# Patient Record
Sex: FEMALE | Race: ASIAN | Employment: UNEMPLOYED | URBAN - METROPOLITAN AREA
[De-identification: names, ages, dates, MRNs, and addresses within clinical notes are randomized per-mention and may not be internally consistent; named-entity substitution may affect disease eponyms.]

---

## 2017-03-20 ENCOUNTER — TELEPHONE (OUTPATIENT)
Dept: INTERNAL MEDICINE CLINIC | Age: 52
End: 2017-03-20

## 2017-03-20 NOTE — TELEPHONE ENCOUNTER
Pt's daughter  Layne Conner would like a return call ref to her mother's  Care.  [facility] ph# [phone]

## 2017-03-23 ENCOUNTER — OFFICE VISIT (OUTPATIENT)
Dept: INTERNAL MEDICINE CLINIC | Age: 52
End: 2017-03-23

## 2017-03-23 VITALS
DIASTOLIC BLOOD PRESSURE: 78 MMHG | OXYGEN SATURATION: 98 % | HEIGHT: 64 IN | RESPIRATION RATE: 16 BRPM | WEIGHT: 228 LBS | TEMPERATURE: 97.3 F | BODY MASS INDEX: 38.93 KG/M2 | SYSTOLIC BLOOD PRESSURE: 119 MMHG | HEART RATE: 71 BPM

## 2017-03-23 DIAGNOSIS — G43.909 MIGRAINE WITHOUT STATUS MIGRAINOSUS, NOT INTRACTABLE, UNSPECIFIED MIGRAINE TYPE: ICD-10-CM

## 2017-03-23 DIAGNOSIS — E11.00 UNCONTROLLED TYPE 2 DIABETES MELLITUS WITH HYPEROSMOLARITY WITHOUT COMA, WITHOUT LONG-TERM CURRENT USE OF INSULIN (HCC): Primary | ICD-10-CM

## 2017-03-23 DIAGNOSIS — F43.21 GRIEF: ICD-10-CM

## 2017-03-23 DIAGNOSIS — R60.0 BILATERAL EDEMA OF LOWER EXTREMITY: ICD-10-CM

## 2017-03-23 DIAGNOSIS — G44.229 CHRONIC TENSION-TYPE HEADACHE, NOT INTRACTABLE: ICD-10-CM

## 2017-03-23 DIAGNOSIS — R45.86 MOOD SWINGS: ICD-10-CM

## 2017-03-23 LAB
GLUCOSE POC: 93 MG/DL
HBA1C MFR BLD HPLC: 6.5 %

## 2017-03-23 RX ORDER — SUMATRIPTAN 100 MG/1
100 TABLET, FILM COATED ORAL
Qty: 9 TAB | Refills: 11 | Status: SHIPPED | OUTPATIENT
Start: 2017-03-23 | End: 2017-03-23 | Stop reason: SDUPTHER

## 2017-03-23 RX ORDER — ESOMEPRAZOLE MAGNESIUM 40 MG/1
40 CAPSULE, DELAYED RELEASE ORAL DAILY
Qty: 90 CAP | Refills: 3 | Status: SHIPPED | OUTPATIENT
Start: 2017-03-23 | End: 2018-04-06

## 2017-03-23 RX ORDER — AMITRIPTYLINE HYDROCHLORIDE 25 MG/1
25 TABLET, FILM COATED ORAL
Qty: 180 TAB | Refills: 3 | Status: SHIPPED | OUTPATIENT
Start: 2017-03-23 | End: 2018-04-04

## 2017-03-23 RX ORDER — SUMATRIPTAN 100 MG/1
100 TABLET, FILM COATED ORAL
Qty: 27 TAB | Refills: 3 | Status: SHIPPED | OUTPATIENT
Start: 2017-03-23 | End: 2017-11-22 | Stop reason: SDUPTHER

## 2017-03-23 NOTE — PROGRESS NOTES
1. Have you been to the ER, urgent care clinic since your last visit? Hospitalized since your last visit? No.    2. Have you seen or consulted any other health care providers outside of the [address] [place] [address] since your last visit? Include any pap smears or colon screening. No.  Had Excedrin extra strength  2 days ago.

## 2017-03-23 NOTE — MR AVS SNAPSHOT
Visit Information Date & Time Provider Department Dept. Phone Encounter #  
 3/23/2017 10:30 AM Aniceto Habermann, [address] [id number] Follow-up Instructions Return in about 3 months (around 6/23/2017) for DM. Upcoming Health Maintenance Date Due Hepatitis C Screening 1965 COLONOSCOPY 1/7/1981 BREAST CANCER SCRN MAMMOGRAM 9/2/2018 PAP AKA CERVICAL CYTOLOGY 6/2/2019 DTaP/Tdap/Td series (2 - Td) 6/2/2024 Allergies as of 3/23/2017  Review Complete On: 3/23/2017 By: Ajit Lui LPN Severity Noted Reaction Type Reactions Amoxicillin  08/05/2015    Cough Current Immunizations  Never Reviewed Name Date Influenza Vaccine 9/17/2013 Influenza Vaccine Split 12/22/2010 TB Skin Test (PPD) Intradermal 4/4/2016 Tdap 6/2/2014 11:17 PM  
  
 Not reviewed this visit You Were Diagnosed With   
  
 Codes Comments Uncontrolled type 2 diabetes mellitus with hyperosmolarity without coma, without long-term current use of insulin ([facility]    -  Primary ICD-10-CM: E11.00 ICD-9-CM: 250.22 Migraine without status migrainosus, not intractable, unspecified migraine type     ICD-10-CM: G43.909 ICD-9-CM: 346.90 Bilateral edema of lower extremity     ICD-10-CM: R60.0 ICD-9-CM: 150. 3 Chronic tension-type headache, not intractable     ICD-10-CM: E21.322 ICD-9-CM: 339.12 Grief     ICD-10-CM: B96.30 ICD-9-CM: 309.0 Mood swings ([facility]     ICD-10-CM: F39 
ICD-9-CM: 296.99 Vitals BP Pulse Temp Resp Height(growth percentile) Weight(growth percentile) 119/78 (BP 1 Location: Left arm, BP Patient Position: Sitting) 71 97.3 °F (36.3 °C) (Oral) 16 5' 4\" (1.626 m) 228 lb (103.4 kg) LMP SpO2 BMI OB Status Smoking Status 08/01/2008 98% 39.14 kg/m2 Postmenopausal Never Smoker Vitals History BMI and BSA Data  Body Mass Index Body Surface Area  
 39.14 kg/m 2 2.16 m 2  
  
  
 Preferred Pharmacy Pharmacy Name Phone [facility] [address], [address] [place] [address] Road [phone] Your Updated Medication List  
  
   
This list is accurate as of: 3/23/17 12:15 PM.  Always use your most recent med list.  
  
  
  
  
 * albuterol 90 mcg/actuation inhaler Commonly known as:  PROVENTIL HFA, VENTOLIN HFA, PROAIR HFA  
= 2 PUFF each dose, Inhalation, every 4 hours, PRN: as needed for wheezing or cough, # 1 EA, 3 Refills, Pharmacy: [facility] * albuterol 90 mcg/actuation inhaler Commonly known as:  PROVENTIL HFA, VENTOLIN HFA, PROAIR HFA  
= 2 PUFF each dose, Inhalation, every 4 hours, PRN: as needed for wheezing or cough, # 1 EA, 3 Refills, Pharmacy: [facility]  
  
 amitriptyline 25 mg tablet Commonly known as:  ELAVIL Take 1 Tab by mouth nightly. 1-2 pills at bedtime to reduce frequency of headaches  
  
 cyanocobalamin 1,000 mcg tablet Take 1,000 mcg by mouth daily. DIETARY SUPPLEMENT PO Take  by mouth.  
  
 esomeprazole 40 mg capsule Commonly known as:  NexIUM Take 1 Cap by mouth daily. mometasone 50 mcg/actuation nasal spray Commonly known as:  NASONEX  
by Nasal route. multivitamin tablet Commonly known as:  ONE A DAY Take 1 Tab by mouth daily. PEG 3350-Electrolytes 236-22.74-6.74 -5.86 gram suspension Commonly known as:  GO-LYTELY  
240 mL, PO, every 15 minutes, Please dispense(4)bisocodyl tabs to complete prep. Follow instructions from [facility]., 0 Refills, Dispense: 4,000, mL, Pharmacy [facility]  
  
 sertraline 25 mg tablet Commonly known as:  ZOLOFT Take 1 Tab by mouth daily. SUMAtriptan 100 mg tablet Commonly known as:  IMITREX Take 1 Tab by mouth once as needed for Migraine for up to 1 dose. May repeat in 2 hours prn, max 200 mg in 24 hours. traMADol 50 mg tablet Commonly known as:  ULTRAM  
Take 1 Tab by mouth every eight (8) hours as needed for Pain.  Max Daily Amount: 150 mg.  
  
 * Notice: This list has 2 medication(s) that are the same as other medications prescribed for you. Read the directions carefully, and ask your doctor or other care provider to review them with you. Prescriptions Sent to Pharmacy Refills SUMAtriptan (IMITREX) 100 mg tablet 3 Sig: Take 1 Tab by mouth once as needed for Migraine for up to 1 dose. May repeat in 2 hours prn, max 200 mg in 24 hours. Class: Normal  
 Pharmacy: [address] Ph #: [phone] Route: Oral  
 amitriptyline (ELAVIL) 25 mg tablet 3 Sig: Take 1 Tab by mouth nightly. 1-2 pills at bedtime to reduce frequency of headaches Class: Normal  
 Pharmacy: [address] Ph #: [phone] Route: Oral  
 esomeprazole (NEXIUM) 40 mg capsule 3 Sig: Take 1 Cap by mouth daily. Class: Normal  
 Pharmacy: [address] Ph #: [phone] Route: Oral  
  
We Performed the Following AMB POC GLUCOSE BLOOD, BY GLUCOSE MONITORING DEVICE [00050 CPT(R)] AMB POC HEMOGLOBIN A1C [17695 CPT(R)] AMB SUPPLY ORDER [4980270223 Custom] Comments: Moderate compression hose, knee high, 2 pairs LIPID PANEL [79894 CPT(R)] METABOLIC PANEL, COMPREHENSIVE [89608 CPT(R)] REFERRAL TO PSYCHIATRY [REF91 Custom] Comments:  
 Please evaluate patient for mood disorder Follow-up Instructions Return in about 3 months (around 6/23/2017) for DM. Referral Information Referral ID Referred By Referred To  
  
 [id number] Andrea Mayorga Not Available Visits Status Start Date End Date 1 New Request 3/23/17 3/23/18 If your referral has a status of pending review or denied, additional information will be sent to support the outcome of this decision. Introducing [facility] & HEALTH SERVICES! Paula Chaney introduces [employer] patient portal. Now you can access parts of your medical record, email your doctor's office, and request medication refills online. 1. In your internet browser, go to [email or web address] 2. Click on the First Time User? Click Here link in the Sign In box. You will see the New Member Sign Up page. 3. Enter your [employer] Access Code exactly as it appears below. You will not need to use this code after youve completed the sign-up process. If you do not sign up before the expiration date, you must request a new code. · [employer] Access Code: [email or web address] Expires: 6/21/2017 10:27 AM 
 
4. Enter the last four digits of your Social Security Number (xxxx) and Date of Birth (mm/dd/yyyy) as indicated and click Submit. You will be taken to the next sign-up page. 5. Create a Pokelabo ID. This will be your Pokelabo login ID and cannot be changed, so think of one that is secure and easy to remember. 6. Create a [employer]elabo password. You can change your password at any time. 7. Enter your Password Reset Question and Answer. This can be used at a later time if you forget your password. 8. Enter your e-mail address. You will receive e-mail notification when new information is available in [address] 9. Click Sign Up. You can now view and download portions of your medical record. 10. Click the Download Summary menu link to download a portable copy of your medical information. If you have questions, please visit the Frequently Asked Questions section of the [employer]elabo website. Remember, [employer]elabo is NOT to be used for urgent needs. For medical emergencies, dial 911. Now available from your iPhone and Android! Please provide this summary of care documentation to your next provider. Your primary care clinician is listed as [address]. If you have any questions after today's visit, please call [phone].

## 2017-03-23 NOTE — PROGRESS NOTES
Eda Augustin is a 45 y.o. female and presents with Leg Swelling; Migraine; and Gas  . C/o bilateral leg swelling x 2 months. Constant. Worse at end of the day. +SOB. No CP. No nausea or vomiting. Admits to getting increased swelling in the ankles at the end of the day. C/o headache x 4 days , but its gone now. Been taking Excedrin which prevents her from sleeping. Her 21 yr old son  suddenly on unknown causes a few wks ago. She is devastated and is intermittently crying throughout her interview. Her daughter is here and is concerned about her mother's mental state. She said her mom has always had a very difficult personality and describes her a narcissistic. This was occurring even before her brothers death. The mom is now moving in with her daughter and the family is interested in getting her some help so she can get along better with others. She wants her to see a psychiatrist.       Review of Systems  Constitutional: negative for fevers, chills, anorexia and weight loss  Eyes:   negative for visual disturbance and irritation  ENT:   negative for tinnitus,sore throat,nasal congestion,ear pains. hoarseness  Respiratory:  negative for cough, hemoptysis, dyspnea,wheezing  CV:   negative for chest pain, palpitations, lower extremity edema  GI:   negative for nausea, vomiting, diarrhea, abdominal pain,melena  Endo:               negative for polyuria,polydipsia,polyphagia,heat intolerance  Genitourinary: negative for frequency, dysuria and hematuria  Integument:  negative for rash and pruritus  Hematologic:  negative for easy bruising and gum/nose bleeding  Musculoskel: negative for myalgias, arthralgias, back pain, muscle weakness, joint pain  Neurological:  negative for headaches, dizziness, vertigo, memory problems and gait   Behavl/Psych: negative for feelings of anxiety, depression, mood changes    Past Medical History:   Diagnosis Date    Asthma     GERD (gastroesophageal reflux disease)     Headache(784.0)     migraine     History reviewed. No pertinent surgical history. Social History     Social History    Marital status: UNKNOWN     Spouse name: N/A    Number of children: N/A    Years of education: N/A     Social History Main Topics    Smoking status: Never Smoker    Smokeless tobacco: Never Used    Alcohol use No    Drug use: No    Sexual activity: Yes     Other Topics Concern    None     Social History Narrative     Current Outpatient Prescriptions   Medication Sig Dispense Refill    SUMAtriptan (IMITREX) 100 mg tablet Take 1 Tab by mouth once as needed for Migraine for up to 1 dose. May repeat in 2 hours prn, max 200 mg in 24 hours. 27 Tab 3    amitriptyline (ELAVIL) 25 mg tablet Take 1 Tab by mouth nightly. 1-2 pills at bedtime to reduce frequency of headaches 180 Tab 3    esomeprazole (NEXIUM) 40 mg capsule Take 1 Cap by mouth daily. 90 Cap 3    multivitamin (ONE A DAY) tablet Take 1 Tab by mouth daily. 30 Tab 6    cyanocobalamin 1,000 mcg tablet Take 1,000 mcg by mouth daily.  DIETARY SUPPLEMENT PO Take  by mouth.  traMADol (ULTRAM) 50 mg tablet Take 1 Tab by mouth every eight (8) hours as needed for Pain. Max Daily Amount: 150 mg. 40 Tab 0    albuterol (PROVENTIL HFA, VENTOLIN HFA, PROAIR HFA) 90 mcg/actuation inhaler   = 2 PUFF each dose, Inhalation, every 4 hours, PRN: as needed for wheezing or cough, # 1 EA, 3 Refills, Pharmacy: [facility]      albuterol (PROVENTIL HFA, VENTOLIN HFA, PROAIR HFA) 90 mcg/actuation inhaler   = 2 PUFF each dose, Inhalation, every 4 hours, PRN: as needed for wheezing or cough, # 1 EA, 3 Refills, Pharmacy: [facility]      mometasone (NASONEX) 50 mcg/actuation nasal spray by Nasal route.  sertraline (ZOLOFT) 25 mg tablet Take 1 Tab by mouth daily.  30 Tab 11    PEG 3350-Electrolytes (GO-LYTELY) 236-22.74-6.74 -5.86 gram suspension  240 mL, PO, every 15 minutes, Please dispense(4)bisocodyl tabs to complete prep.Follow instructions from [facility]., 0 Refills, Dispense: 4,000, mL, Pharmacy [facility]       Allergies   Allergen Reactions    Amoxicillin Cough       Objective:  Visit Vitals    /78 (BP 1 Location: Left arm, BP Patient Position: Sitting)    Pulse 71    Temp 97.3 °F (36.3 °C) (Oral)    Resp 16    Ht 5' 4\" (1.626 m)    Wt 228 lb (103.4 kg)    LMP 08/01/2008    SpO2 98%    BMI 39.14 kg/m2     Physical Exam:   General appearance - alert, well appearing, and in no distress  Mental status - alert, oriented to person, place, and time  Chest - clear to auscultation, no wheezes, rales or rhonchi, symmetric air entry   Heart - normal rate, regular rhythm, normal S1, S2, no murmurs, rubs, clicks or gallops   Abdomen - soft, nontender, nondistended, no masses or organomegaly  Lymph- no adenopathy palpable  Ext-peripheral pulses normal, no pedal edema, no clubbing or cyanosis  Skin-Warm and dry. no hyperpigmentation, vitiligo, or suspicious lesions  Neuro -alert, oriented, normal speech, no focal findings or movement disorder noted      Results for orders placed or performed in visit on 03/23/17   AMB POC GLUCOSE BLOOD, BY GLUCOSE MONITORING DEVICE   Result Value Ref Range    Glucose POC 93 mg/dL   AMB POC HEMOGLOBIN A1C   Result Value Ref Range    Hemoglobin A1c (POC) 6.5 %       Assessment/Plan:    ICD-10-CM ICD-9-CM    1. Uncontrolled type 2 diabetes mellitus with hyperosmolarity without coma, without long-term current use of insulin ([facility]) E11.00 250.22 AMB POC GLUCOSE BLOOD, BY GLUCOSE MONITORING DEVICE      AMB POC HEMOGLOBIN N5O      METABOLIC PANEL, COMPREHENSIVE      LIPID PANEL   2. Migraine without status migrainosus, not intractable, unspecified migraine type G43.909 346.90 SUMAtriptan (IMITREX) 100 mg tablet      DISCONTINUED: SUMAtriptan (IMITREX) 100 mg tablet   3. Bilateral edema of lower extremity R60.0 782.3 AMB SUPPLY ORDER   4.  Chronic tension-type headache, not intractable G44.229 339.12 amitriptyline (ELAVIL) 25 mg tablet   5. Grief F43.20 309.0    6. Mood swings ([facility]) F39 296.99 REFERRAL TO PSYCHIATRY     Orders Placed This Encounter    AMB SUPPLY ORDER     Moderate compression hose, knee high, 2 pairs    METABOLIC PANEL, COMPREHENSIVE    LIPID PANEL    REFERRAL TO PSYCHIATRY     Referral Priority:   Routine     Referral Type:   Behavioral Health     Referral Reason:   Specialty Services Required    AMB POC GLUCOSE BLOOD, BY GLUCOSE MONITORING DEVICE    AMB POC HEMOGLOBIN A1C    DISCONTD: SUMAtriptan (IMITREX) 100 mg tablet     Sig: Take 1 Tab by mouth once as needed for Migraine for up to 1 dose. May repeat in 2 hours prn, max 200 mg in 24 hours. Dispense:  9 Tab     Refill:  11    SUMAtriptan (IMITREX) 100 mg tablet     Sig: Take 1 Tab by mouth once as needed for Migraine for up to 1 dose. May repeat in 2 hours prn, max 200 mg in 24 hours. Dispense:  27 Tab     Refill:  3    amitriptyline (ELAVIL) 25 mg tablet     Sig: Take 1 Tab by mouth nightly. 1-2 pills at bedtime to reduce frequency of headaches     Dispense:  180 Tab     Refill:  3    esomeprazole (NEXIUM) 40 mg capsule     Sig: Take 1 Cap by mouth daily. Dispense:  90 Cap     Refill:  3     DM- control is slipping but is still good  Migraines- refilled meds  Personality d/o- psych referral  Grief over recent death of her son  Leg swelling which is really moreso due to Weight gain due to above- discussed diet and exercise  Very mild dependent edema- bilat compression stockings. Follow-up Disposition:  Return in about 3 months (around 6/23/2017) for DM.

## 2017-03-31 NOTE — TELEPHONE ENCOUNTER
Spoke with Tashia Sanderson, she stated she just needs to speak with the Dr. She did not want to give me any info. I suggested using my chart but she said she will try another time. Clear bilaterally, pupils equal, round and reactive to light.

## 2017-11-22 ENCOUNTER — OFFICE VISIT (OUTPATIENT)
Dept: INTERNAL MEDICINE CLINIC | Age: 52
End: 2017-11-22

## 2017-11-22 VITALS
HEART RATE: 79 BPM | OXYGEN SATURATION: 98 % | SYSTOLIC BLOOD PRESSURE: 125 MMHG | BODY MASS INDEX: 38.76 KG/M2 | TEMPERATURE: 97.6 F | HEIGHT: 64 IN | WEIGHT: 227 LBS | RESPIRATION RATE: 20 BRPM | DIASTOLIC BLOOD PRESSURE: 83 MMHG

## 2017-11-22 DIAGNOSIS — L72.3 SEBACEOUS CYST: ICD-10-CM

## 2017-11-22 DIAGNOSIS — Z12.11 COLON CANCER SCREENING: ICD-10-CM

## 2017-11-22 DIAGNOSIS — E66.9 OBESITY (BMI 30-39.9): ICD-10-CM

## 2017-11-22 DIAGNOSIS — G43.909 MIGRAINE WITHOUT STATUS MIGRAINOSUS, NOT INTRACTABLE, UNSPECIFIED MIGRAINE TYPE: Primary | ICD-10-CM

## 2017-11-22 RX ORDER — SUMATRIPTAN 100 MG/1
100 TABLET, FILM COATED ORAL
Qty: 27 TAB | Refills: 3 | Status: SHIPPED | OUTPATIENT
Start: 2017-11-22 | End: 2018-07-27 | Stop reason: SDUPTHER

## 2017-11-22 NOTE — PROGRESS NOTES
1. Have you been to the ER, urgent care clinic since your last visit? Hospitalized since your last visit? No    2. Have you seen or consulted any other health care providers outside of the [address] [place] [address] since your last visit? Include any pap smears or colon screening.  No.

## 2017-11-22 NOTE — MR AVS SNAPSHOT
Visit Information Date & Time Provider Department Dept. Phone Encounter #  
 11/22/2017 10:40 AM Lili Cortez, [address] [id number] Follow-up Instructions Return in about 1 year (around 11/22/2018) for physical exam. Upcoming Health Maintenance Date Due Hepatitis C Screening 1965 COLONOSCOPY 1/7/1981 PAP AKA CERVICAL CYTOLOGY 6/2/2019 BREAST CANCER SCRN MAMMOGRAM 9/11/2019 DTaP/Tdap/Td series (2 - Td) 6/2/2024 Allergies as of 11/22/2017  Review Complete On: 11/22/2017 By: Saida Contreras LPN Severity Noted Reaction Type Reactions Amoxicillin  08/05/2015    Cough Current Immunizations  Never Reviewed Name Date Influenza Vaccine 9/17/2013 Influenza Vaccine Split 12/22/2010 TB Skin Test (PPD) Intradermal 4/4/2016 Tdap 6/2/2014 11:17 PM  
  
 Not reviewed this visit You Were Diagnosed With   
  
 Codes Comments Migraine without status migrainosus, not intractable, unspecified migraine type    -  Primary ICD-10-CM: A76.042 ICD-9-CM: 346.90 Colon cancer screening     ICD-10-CM: Z12.11 ICD-9-CM: V76.51 Obesity (BMI 30-39. 9)     ICD-10-CM: E66.9 ICD-9-CM: 278.00 Vitals BP Pulse Temp Resp Height(growth percentile) Weight(growth percentile) 125/83 (BP 1 Location: Left arm, BP Patient Position: Sitting) 79 97.6 °F (36.4 °C) (Oral) 20 5' 4\" (1.626 m) 227 lb (103 kg) LMP SpO2 BMI OB Status Smoking Status 08/01/2008 98% 38.96 kg/m2 Postmenopausal Never Smoker Vitals History BMI and BSA Data Body Mass Index Body Surface Area  
 38.96 kg/m 2 2.16 m 2 Preferred Pharmacy Pharmacy Name Phone [facility] [address], [address] [phone] Your Updated Medication List  
  
   
This list is accurate as of: 11/22/17 11:51 AM.  Always use your most recent med list.  
  
  
  
  
 * albuterol 90 mcg/actuation inhaler Commonly known as:  PROVENTIL HFA, VENTOLIN HFA, PROAIR HFA  
= 2 PUFF each dose, Inhalation, every 4 hours, PRN: as needed for wheezing or cough, # 1 EA, 3 Refills, Pharmacy: [address] * albuterol 90 mcg/actuation inhaler Commonly known as:  PROVENTIL HFA, VENTOLIN HFA, PROAIR HFA  
= 2 PUFF each dose, Inhalation, every 4 hours, PRN: as needed for wheezing or cough, # 1 EA, 3 Refills, Pharmacy: [facility]  
  
 amitriptyline 25 mg tablet Commonly known as:  ELAVIL Take 1 Tab by mouth nightly. 1-2 pills at bedtime to reduce frequency of headaches  
  
 cyanocobalamin 1,000 mcg tablet Take 1,000 mcg by mouth daily. DIETARY SUPPLEMENT PO Take  by mouth.  
  
 esomeprazole 40 mg capsule Commonly known as:  NexIUM Take 1 Cap by mouth daily. mometasone 50 mcg/actuation nasal spray Commonly known as:  NASONEX  
by Nasal route. multivitamin tablet Commonly known as:  ONE A DAY Take 1 Tab by mouth daily. PEG 3350-Electrolytes 236-22.74-6.74 -5.86 gram suspension Commonly known as:  GO-LYTELY  
240 mL, PO, every 15 minutes, Please dispense(4)bisocodyl tabs to complete prep. Follow instructions from [facility] 0 Refills, Dispense: 4,000, mL, Pharmacy [facility]  
  
 sertraline 25 mg tablet Commonly known as:  ZOLOFT Take 1 Tab by mouth daily. SUMAtriptan 100 mg tablet Commonly known as:  IMITREX Take 1 Tab by mouth once as needed for Migraine for up to 1 dose. May repeat in 2 hours prn, max 200 mg in 24 hours. traMADol 50 mg tablet Commonly known as:  ULTRAM  
Take 1 Tab by mouth every eight (8) hours as needed for Pain. Max Daily Amount: 150 mg.  
  
 * Notice: This list has 2 medication(s) that are the same as other medications prescribed for you. Read the directions carefully, and ask your doctor or other care provider to review them with you. Prescriptions Sent to Pharmacy Refills SUMAtriptan (IMITREX) 100 mg tablet 3 Sig: Take 1 Tab by mouth once as needed for Migraine for up to 1 dose. May repeat in 2 hours prn, max 200 mg in 24 hours. Class: Normal  
 Pharmacy: [facility] [address], [address] [place] [address] #: [phone] Route: Oral  
  
We Performed the Following CBC W/O DIFF [92705 CPT(R)] LIPID PANEL [94387 CPT(R)] METABOLIC PANEL, COMPREHENSIVE [48419 CPT(R)] REFERRAL TO GASTROENTEROLOGY [TOR67 Custom] Comments: [facility] 
colonoscopy TSH 3RD GENERATION [70027 CPT(R)] Follow-up Instructions Return in about 1 year (around 11/22/2018) for physical exam.  
  
  
Referral Information Referral ID Referred By Referred To  
  
 [id number] Gabriella Honey Not Available Visits Status Start Date End Date 1 New Request 11/22/17 11/22/18 If your referral has a status of pending review or denied, additional information will be sent to support the outcome of this decision. Introducing [facility] & HEALTH SERVICES! [facility] introduces [employer] patient portal. Now you can access parts of your medical record, email your doctor's office, and request medication refills online. 1. In your internet browser, go to [email or web address] 2. Click on the First Time User? Click Here link in the Sign In box. You will see the New Member Sign Up page. 3. Enter your [employer] Access Code exactly as it appears below. You will not need to use this code after youve completed the sign-up process. If you do not sign up before the expiration date, you must request a new code. · [employer] Access Code: [email or web address] Expires: 2/20/2018 11:51 AM 
 
4. Enter the last four digits of your Social Security Number (xxxx) and Date of Birth (mm/dd/yyyy) as indicated and click Submit. You will be taken to the next sign-up page. 5. Create a [employer] ID.  This will be your [employer] login ID and cannot be changed, so think of one that is secure and easy to remember. 6. Create a [employer]idio password. You can change your password at any time. 7. Enter your Password Reset Question and Answer. This can be used at a later time if you forget your password. 8. Enter your e-mail address. You will receive e-mail notification when new information is available in [employer]. 9. Click Sign Up. You can now view and download portions of your medical record. 10. Click the Download Summary menu link to download a portable copy of your medical information. If you have questions, please visit the Frequently Asked Questions section of the [employer] website. Remember, [employer] is NOT to be used for urgent needs. For medical emergencies, dial 911. Now available from your iPhone and Android! Please provide this summary of care documentation to your next provider. Your primary care clinician is listed as Roddy Pruett. If you have any questions after today's visit, please call [phone].

## 2017-11-22 NOTE — PROGRESS NOTES
Rashid Nelson is a 45 y.o. female and presents with Migraine; Foot Pain; and Medication Refill  . Subjective:  Pt has [address] insurance    First visit w me. Pt requests med refills. Heel spur-[facility] s/p 3 cortisone inj w/o improvement. Will be starting PT    Migraine headaches-requests refill on imitrex. Currently quiescent    HM-due for routine labs and colonoscopy    Pt has an enlarging bump on her forehead and would like a referral to derm    Review of Systems  Constitutional: negative for fevers, chills, anorexia and weight loss  Respiratory:  negative for cough, hemoptysis, dyspnea,wheezing  CV:   negative for chest pain, palpitations, lower extremity edema  GI:   negative for nausea, vomiting, diarrhea, abdominal pain,melena  Musculoskel: positive for myalgias, arthralgias, back pain,  joint pain  Neurological:  positive for headaches, dizziness, vertigo, memory problems   Behavl/Psych: negative for feelings of anxiety, depression, mood changes    Past Medical History:   Diagnosis Date    Asthma     GERD (gastroesophageal reflux disease)     Headache(784.0)     migraine     History reviewed. No pertinent surgical history.   Social History     Social History    Marital status: UNKNOWN     Spouse name: N/A    Number of children: N/A    Years of education: N/A     Social History Main Topics    Smoking status: Never Smoker    Smokeless tobacco: Never Used    Alcohol use No    Drug use: No    Sexual activity: Yes     Other Topics Concern    None     Social History Narrative     Family History   Problem Relation Age of Onset    Diabetes Mother     Hypertension Mother     Heart Disease Father     Cancer Father     No Known Problems Sister     No Known Problems Brother     No Known Problems Maternal Grandmother     No Known Problems Maternal Grandfather     No Known Problems Paternal Grandmother     No Known Problems Paternal Grandfather     No Known Problems Brother     No Known Problems Brother      Current Outpatient Prescriptions   Medication Sig Dispense Refill    SUMAtriptan (IMITREX) 100 mg tablet Take 1 Tab by mouth once as needed for Migraine for up to 1 dose. May repeat in 2 hours prn, max 200 mg in 24 hours. 27 Tab 3    esomeprazole (NEXIUM) 40 mg capsule Take 1 Cap by mouth daily. 90 Cap 3    multivitamin (ONE A DAY) tablet Take 1 Tab by mouth daily. 30 Tab 6    albuterol (PROVENTIL HFA, VENTOLIN HFA, PROAIR HFA) 90 mcg/actuation inhaler   = 2 PUFF each dose, Inhalation, every 4 hours, PRN: as needed for wheezing or cough, # 1 EA, 3 Refills, Pharmacy: [facility]      albuterol (PROVENTIL HFA, VENTOLIN HFA, PROAIR HFA) 90 mcg/actuation inhaler   = 2 PUFF each dose, Inhalation, every 4 hours, PRN: as needed for wheezing or cough, # 1 EA, 3 Refills, Pharmacy: [facility]      amitriptyline (ELAVIL) 25 mg tablet Take 1 Tab by mouth nightly. 1-2 pills at bedtime to reduce frequency of headaches 180 Tab 3    mometasone (NASONEX) 50 mcg/actuation nasal spray by Nasal route. Allergies   Allergen Reactions    Amoxicillin Cough       Objective:  Visit Vitals    /83 (BP 1 Location: Left arm, BP Patient Position: Sitting)    Pulse 79    Temp 97.6 °F (36.4 °C) (Oral)    Resp 20    Ht 5' 4\" (1.626 m)    Wt 227 lb (103 kg)    LMP 08/01/2008    SpO2 98%    BMI 38.96 kg/m2     Physical Exam:   General appearance - morbidly obese lady  Mental status - alert, oriented to person, place, and time  EYE-EOMI  Mouth - mucous membranes moist, pharynx normal without lesions  Chest - clear to auscultation, no wheezes, rales or rhonchi, symmetric air entry   Heart - normal rate, regular rhythm, normal S1, S2, no murmurs, rubs, clicks or gallops   Ext-peripheral pulses normal, no pedal edema, no clubbing or cyanosis  Skin-Warm and dry.  Small sq, firm mass mid forehead at hairline   Neuro -alert, oriented, normal speech, no focal findings or movement disorder noted        Results for orders placed or performed in visit on 03/23/17   AMB POC GLUCOSE BLOOD, BY GLUCOSE MONITORING DEVICE   Result Value Ref Range    Glucose POC 93 mg/dL   AMB POC HEMOGLOBIN A1C   Result Value Ref Range    Hemoglobin A1c (POC) 6.5 %       Assessment/Plan:    ICD-10-CM ICD-9-CM    1. Migraine without status migrainosus, not intractable, unspecified migraine type G43.909 346.90 SUMAtriptan (IMITREX) 100 mg tablet      TSH 3RD GENERATION      LIPID PANEL      METABOLIC PANEL, COMPREHENSIVE      CBC W/O DIFF   2. Colon cancer screening Z12.11 V76.51 REFERRAL TO GASTROENTEROLOGY   3. Obesity (BMI 30-39. 9) E66.9 278.00    4. Sebaceous cyst L72.3 706.2 REFERRAL TO DERMATOLOGY     Orders Placed This Encounter    TSH 3RD GENERATION    LIPID PANEL    METABOLIC PANEL, COMPREHENSIVE    CBC W/O DIFF    REFERRAL TO GASTROENTEROLOGY     Referral Priority:   Routine     Referral Type:   Consultation     Referral Reason:   Specialty Services Required     Requested Specialty:   Gastroenterology    REFERRAL TO DERMATOLOGY     Referral Priority:   Routine     Referral Type:   Consultation     Referral Reason:   Specialty Services Required     Requested Specialty:   Dermatology    SUMAtriptan (IMITREX) 100 mg tablet     Sig: Take 1 Tab by mouth once as needed for Migraine for up to 1 dose. May repeat in 2 hours prn, max 200 mg in 24 hours. Dispense:  27 Tab     Refill:  3   check routine labs  lose weight, increase physical activity  Patient Instructions               Follow-up Disposition:  Return in about 1 year (around 11/22/2018) for physical exam.      I have reviewed with the patient details of the assessment and plan and all questions were answered. Relevent patient education was performed. The most recent lab findings were reviewed with the patient. An After Visit Summary was printed and given to the patient.

## 2017-11-29 ENCOUNTER — DOCUMENTATION ONLY (OUTPATIENT)
Dept: INTERNAL MEDICINE CLINIC | Age: 52
End: 2017-11-29

## 2017-11-29 RX ORDER — BISMUTH SUBSALICYLATE 262 MG
1 TABLET,CHEWABLE ORAL DAILY
Qty: 30 TAB | Refills: 6 | Status: SHIPPED | OUTPATIENT
Start: 2017-11-29 | End: 2018-07-27 | Stop reason: SDUPTHER

## 2017-11-29 NOTE — PROGRESS NOTES
Appointment schedule for Dermatology at the [facility] for Friday May 4, 2018 at 2pm. Patient is aware.

## 2017-12-01 ENCOUNTER — TELEPHONE (OUTPATIENT)
Dept: INTERNAL MEDICINE CLINIC | Age: 52
End: 2017-12-01

## 2017-12-08 ENCOUNTER — TELEPHONE (OUTPATIENT)
Dept: INTERNAL MEDICINE CLINIC | Age: 52
End: 2017-12-08

## 2017-12-08 NOTE — TELEPHONE ENCOUNTER
Please call her back. I dont have a copy of it, it went to scanning. I mailed a copy of it to her home and she is out of town. She can contact us again in a week or call [facility].

## 2017-12-08 NOTE — TELEPHONE ENCOUNTER
Patient would like for you to give her a call and go over her labs with her, she states that she didn't received the letter that went out, even if she did she would not understand it any, she can be reach after 11am today

## 2017-12-12 NOTE — TELEPHONE ENCOUNTER
Spoke to patient and told her to call back in like 2 weeks when the labs are scan so we can go over them.

## 2018-01-10 ENCOUNTER — OFFICE VISIT (OUTPATIENT)
Dept: INTERNAL MEDICINE CLINIC | Age: 53
End: 2018-01-10

## 2018-01-10 VITALS
OXYGEN SATURATION: 98 % | SYSTOLIC BLOOD PRESSURE: 131 MMHG | HEIGHT: 64 IN | RESPIRATION RATE: 20 BRPM | DIASTOLIC BLOOD PRESSURE: 90 MMHG | WEIGHT: 228 LBS | BODY MASS INDEX: 38.93 KG/M2 | HEART RATE: 75 BPM | TEMPERATURE: 98.2 F

## 2018-01-10 DIAGNOSIS — L08.9 INFECTED SEBACEOUS CYST: Primary | ICD-10-CM

## 2018-01-10 DIAGNOSIS — L72.3 SEBACEOUS CYST: ICD-10-CM

## 2018-01-10 DIAGNOSIS — L72.3 INFECTED SEBACEOUS CYST: Primary | ICD-10-CM

## 2018-01-10 RX ORDER — SULFAMETHOXAZOLE AND TRIMETHOPRIM 800; 160 MG/1; MG/1
1 TABLET ORAL 2 TIMES DAILY
Qty: 20 TAB | Refills: 0 | Status: SHIPPED | OUTPATIENT
Start: 2018-01-10 | End: 2018-04-04

## 2018-01-10 NOTE — PROGRESS NOTES
1. Have you been to the ER, urgent care clinic since your last visit? Hospitalized since your last visit? No.    2. Have you seen or consulted any other health care providers outside of the [address] [place] [address] since your last visit? Include any pap smears or colon screening.  No.

## 2018-01-10 NOTE — PROGRESS NOTES
Hussein De Los Santos is a 52 y.o. female and presents with Cyst (fore head) and Medication Refill  . Subjective:    Cyst on pts forehead has become red, swollen and tender. Pt has an appointment to see dermatology @ [facility] in May. Review of Systems  Constitutional: negative for fevers, chills, anorexia and weight loss  Respiratory:  negative for cough, hemoptysis, dyspnea,wheezing  CV:   negative for chest pain, palpitations, lower extremity edema  GI:   negative for nausea, vomiting, diarrhea, abdominal pain,melena  Musculoskel: positive for myalgias, arthralgias, back pain, muscle weakness, joint pain  Neurological:  negative for headaches, dizziness, vertigo, memory problems and gait   Behavl/Psych: positive for feelings of anxiety, depression, mood changes    Past Medical History:   Diagnosis Date    Asthma     GERD (gastroesophageal reflux disease)     Headache(784.0)     migraine     History reviewed. No pertinent surgical history. Social History     Social History    Marital status: UNKNOWN     Spouse name: N/A    Number of children: N/A    Years of education: N/A     Social History Main Topics    Smoking status: Never Smoker    Smokeless tobacco: Never Used    Alcohol use No    Drug use: No    Sexual activity: Yes     Other Topics Concern    None     Social History Narrative     Family History   Problem Relation Age of Onset    Diabetes Mother     Hypertension Mother     Heart Disease Father     Cancer Father     No Known Problems Sister     No Known Problems Brother     No Known Problems Maternal Grandmother     No Known Problems Maternal Grandfather     No Known Problems Paternal Grandmother     No Known Problems Paternal Grandfather     No Known Problems Brother     No Known Problems Brother      Current Outpatient Prescriptions   Medication Sig Dispense Refill    trimethoprim-sulfamethoxazole (BACTRIM DS, SEPTRA DS) 160-800 mg per tablet Take 1 Tab by mouth two (2) times a day.  21 Tab 0    multivitamin (ONE A DAY) tablet Take 1 Tab by mouth daily. 30 Tab 6    SUMAtriptan (IMITREX) 100 mg tablet Take 1 Tab by mouth once as needed for Migraine for up to 1 dose. May repeat in 2 hours prn, max 200 mg in 24 hours. 27 Tab 3    amitriptyline (ELAVIL) 25 mg tablet Take 1 Tab by mouth nightly. 1-2 pills at bedtime to reduce frequency of headaches 180 Tab 3    esomeprazole (NEXIUM) 40 mg capsule Take 1 Cap by mouth daily. 90 Cap 3    albuterol (PROVENTIL HFA, VENTOLIN HFA, PROAIR HFA) 90 mcg/actuation inhaler   = 2 PUFF each dose, Inhalation, every 4 hours, PRN: as needed for wheezing or cough, # 1 EA, 3 Refills, Pharmacy: [facility]      albuterol (PROVENTIL HFA, VENTOLIN HFA, PROAIR HFA) 90 mcg/actuation inhaler   = 2 PUFF each dose, Inhalation, every 4 hours, PRN: as needed for wheezing or cough, # 1 EA, 3 Refills, Pharmacy: [facility]      mometasone (NASONEX) 50 mcg/actuation nasal spray by Nasal route. Allergies   Allergen Reactions    Amoxicillin Cough       Objective:  Visit Vitals    /90 (BP 1 Location: Left arm, BP Patient Position: Sitting)    Pulse 75    Temp 98.2 °F (36.8 °C) (Oral)    Resp 20    Ht 5' 4\" (1.626 m)    Wt 228 lb (103.4 kg)    LMP 08/01/2008    SpO2 98%    BMI 39.14 kg/m2     Physical Exam:   General appearance - alert, obese, pleasant  Mental status - alert, oriented to person, place, and time  Haed:~ .5cm area of erythema and warmth + tenderness at hairline/midline  EYE-EOMI  Neck - supple,   Chest - symmetric air entry    Abdomen - obese  Ext-no pedal edema, no clubbing or cyanosis  Skin-Warm and dry.  no hyperpigmentation, vitiligo, or suspicious lesions  Neuro -alert, oriented, normal speech, no focal findings or movement disorder noted        Results for orders placed or performed in visit on 03/23/17   AMB POC GLUCOSE BLOOD, BY GLUCOSE MONITORING DEVICE   Result Value Ref Range    Glucose POC 93 mg/dL   AMB POC HEMOGLOBIN A1C   Result Value Ref Range    Hemoglobin A1c (POC) 6.5 %       Assessment/Plan:    ICD-10-CM ICD-9-CM    1. Infected sebaceous cyst L72.3 706.2 trimethoprim-sulfamethoxazole (BACTRIM DS, SEPTRA DS) 160-800 mg per tablet    L08.9  REFERRAL TO GENERAL SURGERY   2. Sebaceous cyst L72.3 706.2      Orders Placed This Encounter    REFERRAL TO GENERAL SURGERY     Referral Priority:   Routine     Referral Type:   Consultation     Referral Reason:   Specialty Services Required    trimethoprim-sulfamethoxazole (BACTRIM DS, SEPTRA DS) 160-800 mg per tablet     Sig: Take 1 Tab by mouth two (2) times a day. Dispense:  20 Tab     Refill:  0     Referral to general surgery for removal  Bactrim DS given  F/u routine/prn  Patient Instructions          Epidermoid Cyst: Care Instructions  Your Care Instructions  An epidermoid (say \"ps-jpn-OMJ-pita\") cyst is a lump just under the skin. These cysts can form when a hair follicle becomes blocked. They are common in acne and may occur on the face, neck, back, and genitals. However, they can form anywhere on the body. These cysts are not cancer and do not lead to cancer. They tend not to hurt, but they can sometimes become swollen and painful. They also may break open (rupture) and cause scarring. These cysts sometimes do not cause problems and may not need treatment. If you have a cyst that is swollen and hurts, your doctor may inject it with a medicine to help it heal. But it is more likely that a painful cyst will need to be removed. Your doctor will give you a shot of numbing medicine and cut into the cyst to drain it or remove it. This makes the symptoms go away. Follow-up care is a key part of your treatment and safety. Be sure to make and go to all appointments, and call your doctor if you are having problems. It's also a good idea to know your test results and keep a list of the medicines you take. How can you care for yourself at home?   · Do not squeeze the cyst or poke it with a needle to open it. This can cause swelling, redness, and infection. · Always have a doctor look at any new lumps you get to make sure that they are not serious. When should you call for help? Watch closely for changes in your health, and be sure to contact your doctor if:  ? · You have a fever, redness, or swelling after you get a shot of medicine in the cyst.   ? · You see or feel a new lump on your skin. Where can you learn more? Go to http://carolynn-loulou.info/. Enter U060 in the search box to learn more about \"Epidermoid Cyst: Care Instructions. \"  Current as of: October 13, 2016  Content Version: 11.4  © 5400-3411 [employer]. Care instructions adapted under license by [employer] (which disclaims liability or warranty for this information). If you have questions about a medical condition or this instruction, always ask your healthcare professional. [place] [address] any warranty or liability for your use of this information. Follow-up Disposition:  Return if symptoms worsen or fail to improve. I have reviewed with the patient details of the assessment and plan and all questions were answered. Relevent patient education was performed. The most recent lab findings were reviewed with the patient. An After Visit Summary was printed and given to the patient.

## 2018-01-10 NOTE — MR AVS SNAPSHOT
Visit Information Date & Time Provider Department Dept. Phone Encounter #  
 1/10/2018  2:20 PM Jelly Pickens, [address]  [id number] Follow-up Instructions Return if symptoms worsen or fail to improve. Your Appointments 11/26/2018  1:00 PM  
ROUTINE CARE with Jelly Pickens MD  
[address]) Appt Note: Complete physical; Complete physical  
 [place] [address] [facility] [id number]  
[phone]  
  
   
 [place] [address] Upcoming Health Maintenance Date Due Hepatitis C Screening 1965 COLONOSCOPY 1/7/1981 PAP AKA CERVICAL CYTOLOGY 6/2/2019 BREAST CANCER SCRN MAMMOGRAM 9/11/2019 DTaP/Tdap/Td series (2 - Td) 6/2/2024 Allergies as of 1/10/2018  Review Complete On: 1/10/2018 By: Gurinder Alexandra LPN Severity Noted Reaction Type Reactions Amoxicillin  08/05/2015    Cough Current Immunizations  Never Reviewed Name Date Influenza Vaccine 9/17/2013 Influenza Vaccine Split 12/22/2010 TB Skin Test (PPD) Intradermal 4/4/2016 Tdap 6/2/2014 11:17 PM  
  
 Not reviewed this visit You Were Diagnosed With   
  
 Codes Comments Infected sebaceous cyst    -  Primary ICD-10-CM: L72.3, L08.9 ICD-9-CM: 706.2 Sebaceous cyst     ICD-10-CM: L72.3 ICD-9-CM: 706. 2 Vitals BP Pulse Temp Resp Height(growth percentile) Weight(growth percentile) 131/90 (BP 1 Location: Left arm, BP Patient Position: Sitting) 75 98.2 °F (36.8 °C) (Oral) 20 5' 4\" (1.626 m) 228 lb (103.4 kg) LMP SpO2 BMI OB Status Smoking Status 08/01/2008 98% 39.14 kg/m2 Postmenopausal Never Smoker Vitals History BMI and BSA Data Body Mass Index Body Surface Area  
 39.14 kg/m 2 2.16 m 2 Preferred Pharmacy Pharmacy Name Phone [facility] [address], [address] [place] [address] [phone] Your Updated Medication List  
  
   
This list is accurate as of: 1/10/18  2:56 PM.  Always use your most recent med list.  
  
  
  
  
 * albuterol 90 mcg/actuation inhaler Commonly known as:  PROVENTIL HFA, VENTOLIN HFA, PROAIR HFA  
= 2 PUFF each dose, Inhalation, every 4 hours, PRN: as needed for wheezing or cough, # 1 EA, 3 Refills, Pharmacy: [facility] * albuterol 90 mcg/actuation inhaler Commonly known as:  PROVENTIL HFA, VENTOLIN HFA, PROAIR HFA  
= 2 PUFF each dose, Inhalation, every 4 hours, PRN: as needed for wheezing or cough, # 1 EA, 3 Refills, Pharmacy: [facility]  
  
 amitriptyline 25 mg tablet Commonly known as:  ELAVIL Take 1 Tab by mouth nightly. 1-2 pills at bedtime to reduce frequency of headaches  
  
 esomeprazole 40 mg capsule Commonly known as:  NexIUM Take 1 Cap by mouth daily. mometasone 50 mcg/actuation nasal spray Commonly known as:  NASONEX  
by Nasal route. multivitamin tablet Commonly known as:  ONE A DAY Take 1 Tab by mouth daily. SUMAtriptan 100 mg tablet Commonly known as:  IMITREX Take 1 Tab by mouth once as needed for Migraine for up to 1 dose. May repeat in 2 hours prn, max 200 mg in 24 hours. trimethoprim-sulfamethoxazole 160-800 mg per tablet Commonly known as:  BACTRIM DS, SEPTRA DS Take 1 Tab by mouth two (2) times a day. * Notice: This list has 2 medication(s) that are the same as other medications prescribed for you. Read the directions carefully, and ask your doctor or other care provider to review them with you. Prescriptions Sent to Pharmacy Refills  
 trimethoprim-sulfamethoxazole (BACTRIM DS, SEPTRA DS) 160-800 mg per tablet 0 Sig: Take 1 Tab by mouth two (2) times a day. Class: Normal  
 Pharmacy: [facility] [address], [address] [place] [address] Ph #: [phone] Route: Oral  
  
We Performed the Following REFERRAL TO GENERAL SURGERY [REF27 Custom] Comments:  
 Pt has [address] Follow-up Instructions Return if symptoms worsen or fail to improve. Referral Information Referral ID Referred By Referred To  
  
 [id number] Shahab Hodgson Not Available Visits Status Start Date End Date 1 New Request 1/10/18 1/10/19 If your referral has a status of pending review or denied, additional information will be sent to support the outcome of this decision. Patient Instructions Epidermoid Cyst: Care Instructions Your Care Instructions An epidermoid (say \"dv-rni-WYE-pita\") cyst is a lump just under the skin. These cysts can form when a hair follicle becomes blocked. They are common in acne and may occur on the face, neck, back, and genitals. However, they can form anywhere on the body. These cysts are not cancer and do not lead to cancer. They tend not to hurt, but they can sometimes become swollen and painful. They also may break open (rupture) and cause scarring. These cysts sometimes do not cause problems and may not need treatment. If you have a cyst that is swollen and hurts, your doctor may inject it with a medicine to help it heal. But it is more likely that a painful cyst will need to be removed. Your doctor will give you a shot of numbing medicine and cut into the cyst to drain it or remove it. This makes the symptoms go away. Follow-up care is a key part of your treatment and safety. Be sure to make and go to all appointments, and call your doctor if you are having problems. It's also a good idea to know your test results and keep a list of the medicines you take. How can you care for yourself at home? · Do not squeeze the cyst or poke it with a needle to open it. This can cause swelling, redness, and infection. · Always have a doctor look at any new lumps you get to make sure that they are not serious. When should you call for help? Watch closely for changes in your health, and be sure to contact your doctor if: 
? · You have a fever, redness, or swelling after you get a shot of medicine in the cyst.  
? · You see or feel a new lump on your skin. Where can you learn more? Go to http://carolynn-loulou.info/. Enter V769 in the search box to learn more about \"Epidermoid Cyst: Care Instructions. \" Current as of: October 13, 2016 Content Version: 11.4 © 8110-8553 [employer]. Care instructions adapted under license by [employer] (which disclaims liability or warranty for this information). If you have questions about a medical condition or this instruction, always ask your healthcare professional. [address] any warranty or liability for your use of this information. Introducing [facility]! Celeste French introduces The [employer] patient portal. Now you can access parts of your medical record, email your doctor's office, and request medication refills online. 1. In your internet browser, go to [email or web address] 2. Click on the First Time User? Click Here link in the Sign In box. You will see the New Member Sign Up page. 3. Enter your [employer] Access Code exactly as it appears below. You will not need to use this code after youve completed the sign-up process. If you do not sign up before the expiration date, you must request a new code. · [employer] Access Code: [email or web address] Expires: 2/20/2018 11:51 AM 
 
4. Enter the last four digits of your Social Security Number (xxxx) and Date of Birth (mm/dd/yyyy) as indicated and click Submit. You will be taken to the next sign-up page. 5. Create a [employer] ID. This will be your [employer] login ID and cannot be changed, so think of one that is secure and easy to remember. 6. Create a [employer] password. You can change your password at any time. 7. Enter your Password Reset Question and Answer. This can be used at a later time if you forget your password. 8. Enter your e-mail address. You will receive e-mail notification when new information is available in [address] 9. Click Sign Up. You can now view and download portions of your medical record. 10. Click the Download Summary menu link to download a portable copy of your medical information. If you have questions, please visit the Frequently Asked Questions section of the [employer] website. Remember, [employer] is NOT to be used for urgent needs. For medical emergencies, dial 911. Now available from your iPhone and Android! Please provide this summary of care documentation to your next provider. Your primary care clinician is listed as Anais Orantes. If you have any questions after today's visit, please call [phone].

## 2018-01-10 NOTE — PATIENT INSTRUCTIONS
Epidermoid Cyst: Care Instructions  Your Care Instructions  An epidermoid (say \"xd-thx-DYG-pita\") cyst is a lump just under the skin. These cysts can form when a hair follicle becomes blocked. They are common in acne and may occur on the face, neck, back, and genitals. However, they can form anywhere on the body. These cysts are not cancer and do not lead to cancer. They tend not to hurt, but they can sometimes become swollen and painful. They also may break open (rupture) and cause scarring. These cysts sometimes do not cause problems and may not need treatment. If you have a cyst that is swollen and hurts, your doctor may inject it with a medicine to help it heal. But it is more likely that a painful cyst will need to be removed. Your doctor will give you a shot of numbing medicine and cut into the cyst to drain it or remove it. This makes the symptoms go away. Follow-up care is a key part of your treatment and safety. Be sure to make and go to all appointments, and call your doctor if you are having problems. It's also a good idea to know your test results and keep a list of the medicines you take. How can you care for yourself at home? · Do not squeeze the cyst or poke it with a needle to open it. This can cause swelling, redness, and infection. · Always have a doctor look at any new lumps you get to make sure that they are not serious. When should you call for help? Watch closely for changes in your health, and be sure to contact your doctor if:  ? · You have a fever, redness, or swelling after you get a shot of medicine in the cyst.   ? · You see or feel a new lump on your skin. Where can you learn more? Go to http://carolynn-loulou.info/. Enter A461 in the search box to learn more about \"Epidermoid Cyst: Care Instructions. \"  Current as of: October 13, 2016  Content Version: 11.4  © 2360-9080 Healthwise, [employer].  Care instructions adapted under license by [employer] (which disclaims liability or warranty for this information). If you have questions about a medical condition or this instruction, always ask your healthcare professional. [address] any warranty or liability for your use of this information.

## 2018-04-04 ENCOUNTER — OFFICE VISIT (OUTPATIENT)
Dept: INTERNAL MEDICINE CLINIC | Age: 53
End: 2018-04-04

## 2018-04-04 VITALS
BODY MASS INDEX: 39.09 KG/M2 | OXYGEN SATURATION: 96 % | RESPIRATION RATE: 20 BRPM | DIASTOLIC BLOOD PRESSURE: 59 MMHG | WEIGHT: 229 LBS | HEART RATE: 86 BPM | TEMPERATURE: 98.4 F | HEIGHT: 64 IN | SYSTOLIC BLOOD PRESSURE: 106 MMHG

## 2018-04-04 DIAGNOSIS — M25.521 ELBOW PAIN, RIGHT: ICD-10-CM

## 2018-04-04 DIAGNOSIS — E11.9 DIABETES MELLITUS TYPE 2, DIET-CONTROLLED (HCC): ICD-10-CM

## 2018-04-04 DIAGNOSIS — J40 BRONCHITIS: ICD-10-CM

## 2018-04-04 DIAGNOSIS — Z00.00 WELL ADULT EXAM: Primary | ICD-10-CM

## 2018-04-04 DIAGNOSIS — R93.89 ABNORMAL CHEST X-RAY: ICD-10-CM

## 2018-04-04 DIAGNOSIS — G47.33 OSA (OBSTRUCTIVE SLEEP APNEA): ICD-10-CM

## 2018-04-04 DIAGNOSIS — F41.1 GENERALIZED ANXIETY DISORDER: ICD-10-CM

## 2018-04-04 DIAGNOSIS — E66.9 OBESITY (BMI 35.0-39.9 WITHOUT COMORBIDITY): ICD-10-CM

## 2018-04-04 LAB
CREATININE, EXTERNAL: 0.61
HBA1C MFR BLD HPLC: 7.4 %
LDL-C, EXTERNAL: 99

## 2018-04-04 RX ORDER — SERTRALINE HYDROCHLORIDE 50 MG/1
50 TABLET, FILM COATED ORAL DAILY
Qty: 30 TAB | Refills: 5 | Status: SHIPPED | OUTPATIENT
Start: 2018-04-04 | End: 2018-08-16 | Stop reason: SDUPTHER

## 2018-04-04 RX ORDER — AZITHROMYCIN 250 MG/1
TABLET, FILM COATED ORAL
Qty: 6 TAB | Refills: 0 | Status: SHIPPED | OUTPATIENT
Start: 2018-04-04 | End: 2018-09-13

## 2018-04-04 NOTE — MR AVS SNAPSHOT
[facility] 
 
 
 [place] [address] [address] 
[phone] Patient: Stacey Choi MRN: [id number] :1965 Visit Information Date & Time Provider Department Dept. Phone Encounter #  
 2018  2:00 PM Moreno Don, [address] [id number] Follow-up Instructions Return in about 6 weeks (around 2018). Your Appointments 2018  1:00 PM  
ROUTINE CARE with Moreno Don MD  
[facility]) Appt Note: Complete physical; Complete physical  
 [place] [address] [address]  
[phone]  
  
   
 [place] [address] Upcoming Health Maintenance Date Due Hepatitis C Screening 1965 COLONOSCOPY 1981 PAP AKA CERVICAL CYTOLOGY 2019 BREAST CANCER SCRN MAMMOGRAM 2019 DTaP/Tdap/Td series (2 - Td) 2024 Allergies as of 2018  Review Complete On: 2018 By: Rigo Peters LPN Severity Noted Reaction Type Reactions Amoxicillin  2015    Cough Current Immunizations  Never Reviewed Name Date Influenza Vaccine 2013 Influenza Vaccine Split 2010 TB Skin Test (PPD) Intradermal 2016 Tdap 2014 11:17 PM  
  
 Not reviewed this visit Vitals BP Pulse Temp Resp Height(growth percentile) Weight(growth percentile) 106/59 (BP 1 Location: Left arm, BP Patient Position: Sitting) 86 98.4 °F (36.9 °C) (Oral) 20 5' 4\" (1.626 m) 229 lb (103.9 kg) LMP SpO2 BMI OB Status Smoking Status 2008 96% 39.31 kg/m2 Postmenopausal Never Smoker Vitals History BMI and BSA Data Body Mass Index Body Surface Area  
 39.31 kg/m 2 2.17 m 2 Preferred Pharmacy [facility] [address], [address] [phone] Your Updated Medication List  
  
   
This list is accurate as of 4/4/18  2:47 PM.  Always use your most recent med list.  
  
  
  
  
 * albuterol 90 mcg/actuation inhaler Commonly known as:  PROVENTIL HFA, VENTOLIN HFA, PROAIR HFA  
= 2 PUFF each dose, Inhalation, every 4 hours, PRN: as needed for wheezing or cough, # 1 EA, 3 Refills, Pharmacy: [facility] * albuterol 90 mcg/actuation inhaler Commonly known as:  PROVENTIL HFA, VENTOLIN HFA, PROAIR HFA  
= 2 PUFF each dose, Inhalation, every 4 hours, PRN: as needed for wheezing or cough, # 1 EA, 3 Refills, Pharmacy: [facility]  
  
 azithromycin 250 mg tablet Commonly known as:  Mack [place] Take 2 tablets today, then take 1 tablet daily  
  
 esomeprazole 40 mg capsule Commonly known as:  NexIUM Take 1 Cap by mouth daily. mometasone 50 mcg/actuation nasal spray Commonly known as:  NASONEX  
by Nasal route. multivitamin tablet Commonly known as:  ONE A DAY Take 1 Tab by mouth daily. sertraline 50 mg tablet Commonly known as:  ZOLOFT Take 1 Tab by mouth daily. SUMAtriptan 100 mg tablet Commonly known as:  IMITREX Take 1 Tab by mouth once as needed for Migraine for up to 1 dose. May repeat in 2 hours prn, max 200 mg in 24 hours. * Notice: This list has 2 medication(s) that are the same as other medications prescribed for you. Read the directions carefully, and ask your doctor or other care provider to review them with you. Prescriptions Sent to Pharmacy Refills  
 sertraline (ZOLOFT) 50 mg tablet 5 Sig: Take 1 Tab by mouth daily. Class: Normal  
 Pharmacy: [facility] [address], [address] [place] [address] Ph #: [phone] Route: Oral  
 azithromycin (ZITHROMAX) 250 mg tablet 0 Sig: Take 2 tablets today, then take 1 tablet daily Class: Normal  
 Pharmacy: [facility] [address], [address] [place] [address] Ph #: [phone] Follow-up Instructions Return in about 6 weeks (around 5/16/2018). Introducing [facility]! [employer] introduces [employer] patient portal. Now you can access parts of your medical record, email your doctor's office, and request medication refills online. 1. In your internet browser, go to [email or web address] 2. Click on the First Time User? Click Here link in the Sign In box. You will see the New Member Sign Up page. 3. Enter your [employer] Access Code exactly as it appears below. You will not need to use this code after youve completed the sign-up process. If you do not sign up before the expiration date, you must request a new code. · [employer] Access Code: [email or web address] Expires: 7/3/2018  1:54 PM 
 
4. Enter the last four digits of your Social Security Number (xxxx) and Date of Birth (mm/dd/yyyy) as indicated and click Submit. You will be taken to the next sign-up page. 5. Create a [employer] ID. This will be your [employer] login ID and cannot be changed, so think of one that is secure and easy to remember. 6. Create a [employer] password. You can change your password at any time. 7. Enter your Password Reset Question and Answer. This can be used at a later time if you forget your password. 8. Enter your e-mail address. You will receive e-mail notification when new information is available in [address] 9. Click Sign Up. You can now view and download portions of your medical record. 10. Click the Download Summary menu link to download a portable copy of your medical information. If you have questions, please visit the Frequently Asked Questions section of the [employer] website. Remember, [employer] is NOT to be used for urgent needs. For medical emergencies, dial 911. Now available from your iPhone and Android! Please provide this summary of care documentation to your next provider. Your primary care clinician is listed as Ani Lopez. If you have any questions after today's visit, please call [phone].

## 2018-04-04 NOTE — PROGRESS NOTES
1. Have you been to the ER, urgent care clinic since your last visit? Hospitalized since your last visit? No    2. Have you seen or consulted any other health care providers outside of the [address] [place] [address] since your last visit? Include any pap smears or colon screening. Yes When: 4/4/18 MCV cyst remove from left side.   PHQ over the last two weeks 4/4/2018   Little interest or pleasure in doing things Not at all   Feeling down, depressed or hopeless Not at all   Total Score PHQ 2 0

## 2018-04-05 ENCOUNTER — TELEPHONE (OUTPATIENT)
Dept: INTERNAL MEDICINE CLINIC | Age: 53
End: 2018-04-05

## 2018-04-05 PROBLEM — R93.89 ABNORMAL CHEST X-RAY: Status: ACTIVE | Noted: 2018-04-05

## 2018-04-05 PROBLEM — J45.20 MILD INTERMITTENT ASTHMA WITHOUT COMPLICATION: Status: ACTIVE | Noted: 2018-04-05

## 2018-04-05 PROBLEM — G47.33 OSA (OBSTRUCTIVE SLEEP APNEA): Status: ACTIVE | Noted: 2018-04-05

## 2018-04-05 PROBLEM — E66.9 OBESITY (BMI 35.0-39.9 WITHOUT COMORBIDITY): Status: ACTIVE | Noted: 2018-04-05

## 2018-04-05 PROBLEM — E11.9 DIABETES MELLITUS TYPE 2, DIET-CONTROLLED (HCC): Status: ACTIVE | Noted: 2018-04-05

## 2018-04-05 PROBLEM — F41.1 GENERALIZED ANXIETY DISORDER: Status: ACTIVE | Noted: 2018-04-05

## 2018-04-05 NOTE — TELEPHONE ENCOUNTER
Appointment scheduled for Ortho for Monday 4/9/2018 at 1:45pm at the [place] [address] 3rd floor with Dr. Julio Squires. Pt made aware.

## 2018-04-06 PROBLEM — E66.01 SEVERE OBESITY (BMI 35.0-39.9) WITH COMORBIDITY (HCC): Status: ACTIVE | Noted: 2018-04-06

## 2018-04-18 DIAGNOSIS — E11.9 DIABETES MELLITUS TYPE 2, DIET-CONTROLLED (HCC): Primary | ICD-10-CM

## 2018-04-18 PROBLEM — E66.01 MORBID OBESITY (HCC): Status: ACTIVE | Noted: 2018-04-05

## 2018-04-18 RX ORDER — METFORMIN HYDROCHLORIDE 500 MG/1
500 TABLET, EXTENDED RELEASE ORAL
Qty: 60 TAB | Refills: 3 | Status: SHIPPED | OUTPATIENT
Start: 2018-04-18 | End: 2018-08-16 | Stop reason: SDUPTHER

## 2018-05-11 ENCOUNTER — TELEPHONE (OUTPATIENT)
Dept: INTERNAL MEDICINE CLINIC | Age: 53
End: 2018-05-11

## 2018-05-11 NOTE — TELEPHONE ENCOUNTER
Patient called and stated that she still has a cough and a itchy burning throat. She would danielle another cough medication. The contact number is [phone].

## 2018-05-11 NOTE — TELEPHONE ENCOUNTER
Writer call patient @ [phone] no answer left message on voice mail for patient to call the office @ [phone].

## 2018-05-16 NOTE — TELEPHONE ENCOUNTER
Spoke to daughter she stated she did try otc allergy medication with no relief. She also said she did two rounds of Delsum. I relayed to pt what you said she still want to get something for cough. Pt said its ok to leave detail message on either her or pt voicemail.

## 2018-05-17 NOTE — TELEPHONE ENCOUNTER
Spoke to pt, she states she did not try otc allergy medication symptoms include watery eyes, sneezing, dry scratchy throat with coughing. I advise her to try Zyrtec or Claritin if medication not effective call the office to schedule an appointment to see the provider.

## 2018-05-22 ENCOUNTER — TELEPHONE (OUTPATIENT)
Dept: DIABETES SERVICES | Age: 53
End: 2018-05-22

## 2018-07-09 DIAGNOSIS — E11.9 TYPE 2 DIABETES MELLITUS WITHOUT COMPLICATION, WITHOUT LONG-TERM CURRENT USE OF INSULIN (HCC): Primary | ICD-10-CM

## 2018-07-27 DIAGNOSIS — G43.909 MIGRAINE WITHOUT STATUS MIGRAINOSUS, NOT INTRACTABLE, UNSPECIFIED MIGRAINE TYPE: ICD-10-CM

## 2018-07-30 RX ORDER — BISMUTH SUBSALICYLATE 262 MG
1 TABLET,CHEWABLE ORAL DAILY
Qty: 30 TAB | Refills: 6 | Status: SHIPPED | OUTPATIENT
Start: 2018-07-30 | End: 2018-08-16 | Stop reason: SDUPTHER

## 2018-07-30 RX ORDER — SUMATRIPTAN 100 MG/1
100 TABLET, FILM COATED ORAL
Qty: 27 TAB | Refills: 3 | Status: SHIPPED | OUTPATIENT
Start: 2018-07-30 | End: 2018-07-30

## 2018-08-16 DIAGNOSIS — E11.9 DIABETES MELLITUS TYPE 2, DIET-CONTROLLED (HCC): ICD-10-CM

## 2018-08-16 DIAGNOSIS — F41.1 GENERALIZED ANXIETY DISORDER: ICD-10-CM

## 2018-08-17 RX ORDER — METFORMIN HYDROCHLORIDE 500 MG/1
500 TABLET, EXTENDED RELEASE ORAL
Qty: 60 TAB | Refills: 3 | Status: SHIPPED | OUTPATIENT
Start: 2018-08-17 | End: 2018-09-13 | Stop reason: DRUGHIGH

## 2018-08-17 RX ORDER — SERTRALINE HYDROCHLORIDE 50 MG/1
50 TABLET, FILM COATED ORAL DAILY
Qty: 30 TAB | Refills: 5 | Status: SHIPPED | OUTPATIENT
Start: 2018-08-17 | End: 2019-03-20 | Stop reason: SDUPTHER

## 2018-08-17 RX ORDER — BISMUTH SUBSALICYLATE 262 MG
1 TABLET,CHEWABLE ORAL DAILY
Qty: 30 TAB | Refills: 6 | Status: SHIPPED | OUTPATIENT
Start: 2018-08-17

## 2018-09-13 ENCOUNTER — OFFICE VISIT (OUTPATIENT)
Dept: INTERNAL MEDICINE CLINIC | Age: 53
End: 2018-09-13

## 2018-09-13 VITALS
TEMPERATURE: 97.6 F | BODY MASS INDEX: 37.56 KG/M2 | OXYGEN SATURATION: 97 % | HEIGHT: 64 IN | DIASTOLIC BLOOD PRESSURE: 71 MMHG | RESPIRATION RATE: 18 BRPM | HEART RATE: 72 BPM | WEIGHT: 220 LBS | SYSTOLIC BLOOD PRESSURE: 106 MMHG

## 2018-09-13 DIAGNOSIS — F41.1 GENERALIZED ANXIETY DISORDER: ICD-10-CM

## 2018-09-13 DIAGNOSIS — B37.2 CANDIDAL SKIN INFECTION: ICD-10-CM

## 2018-09-13 DIAGNOSIS — E11.9 TYPE 2 DIABETES MELLITUS WITHOUT COMPLICATION, WITHOUT LONG-TERM CURRENT USE OF INSULIN (HCC): Primary | ICD-10-CM

## 2018-09-13 DIAGNOSIS — E66.01 MORBID OBESITY (HCC): ICD-10-CM

## 2018-09-13 LAB
CHOLEST SERPL-MCNC: 225 MG/DL
HBA1C MFR BLD HPLC: 7 %
HDLC SERPL-MCNC: 46 MG/DL
LDL CHOLESTEROL POC: 115 MG/DL
NON-HDL GOAL (POC): 179
TCHOL/HDL RATIO (POC): 2.5
TRIGL SERPL-MCNC: 321 MG/DL

## 2018-09-13 RX ORDER — KETOCONAZOLE 20 MG/G
CREAM TOPICAL DAILY
Qty: 60 G | Refills: 1 | Status: SHIPPED | OUTPATIENT
Start: 2018-09-13

## 2018-09-13 RX ORDER — LANCETS
EACH MISCELLANEOUS
Qty: 100 EACH | Refills: 11 | Status: SHIPPED | OUTPATIENT
Start: 2018-09-13 | End: 2019-03-26 | Stop reason: SDUPTHER

## 2018-09-13 RX ORDER — INSULIN PUMP SYRINGE, 3 ML
EACH MISCELLANEOUS
Qty: 1 KIT | Refills: 0 | Status: SHIPPED | OUTPATIENT
Start: 2018-09-13 | End: 2019-03-21

## 2018-09-13 RX ORDER — METFORMIN HYDROCHLORIDE 750 MG/1
750 TABLET, EXTENDED RELEASE ORAL 2 TIMES DAILY
Qty: 60 TAB | Refills: 5 | Status: SHIPPED | OUTPATIENT
Start: 2018-09-13 | End: 2019-03-19 | Stop reason: SDUPTHER

## 2018-09-13 NOTE — MR AVS SNAPSHOT
[facility] 
 
 
 [place] [address] [address] 
[phone] Patient: Tamiko Sherman MRN: [id number] :1965 Visit Information Date & Time Provider Department Dept. Phone Encounter #  
 2018 11:30 AM Antoinette Levy, [address] [id number] Follow-up Instructions Return in about 3 months (around 2018) for DM. Your Appointments 2018  1:00 PM  
ROUTINE CARE with Antoinette Levy MD  
[address] [place] [address]) Appt Note: Complete physical; Complete physical  
 [place] [address] [address]  
[phone]  
  
   
 [place] [address] Upcoming Health Maintenance Date Due Hepatitis C Screening 1965 FOOT EXAM Q1 1973 MICROALBUMIN Q1 1973 EYE EXAM RETINAL OR DILATED Q1 1973 COLONOSCOPY 1981 Pneumococcal 19-64 Medium Risk (1 of 1 - PPSV23) 1982 Influenza Age 5 to Adult 2018 HEMOGLOBIN A1C Q6M 10/4/2018 LIPID PANEL Q1 2019 PAP AKA CERVICAL CYTOLOGY 2019 BREAST CANCER SCRN MAMMOGRAM 2019 DTaP/Tdap/Td series (2 - Td) 2024 Allergies as of 2018  Review Complete On: 2018 By: Gabe Aleman LPN Severity Noted Reaction Type Reactions Amoxicillin  2015    Cough Current Immunizations  Never Reviewed Name Date Influenza Vaccine 2013 Influenza Vaccine Split 2010 TB Skin Test (PPD) Intradermal 2016 Tdap 2014 11:17 PM  
  
 Not reviewed this visit You Were Diagnosed With   
  
 Codes Comments Type 2 diabetes mellitus without complication, without long-term current use of insulin (HCC)    -  Primary ICD-10-CM: E11.9 ICD-9-CM: 250.00 Candidal skin infection     ICD-10-CM: B37.2 ICD-9-CM: 112.3  Morbid obesity ([facility] [address]     ICD-10-CM: E66.01 
 ICD-9-CM: 278.01 Vitals BP Pulse Temp Resp Height(growth percentile) Weight(growth percentile) 106/71 (BP 1 Location: Left arm, BP Patient Position: Sitting) 72 97.6 °F (36.4 °C) (Oral) 18 5' 4\" (1.626 m) 220 lb (99.8 kg) LMP SpO2 BMI OB Status Smoking Status 08/01/2008 97% 37.76 kg/m2 Postmenopausal Never Smoker Vitals History BMI and BSA Data Body Mass Index Body Surface Area  
 37.76 kg/m 2 2.12 m 2 Preferred Pharmacy Pharmacy Name Phone [facility] [address], [address] [phone] Your Updated Medication List  
  
   
This list is accurate as of 9/13/18 12:12 PM.  Always use your most recent med list.  
  
  
  
  
 albuterol 90 mcg/actuation inhaler Commonly known as:  PROVENTIL HFA, VENTOLIN HFA, PROAIR HFA  
= 2 PUFF each dose, Inhalation, every 4 hours, PRN: as needed for wheezing or cough, # 1 EA, 3 Refills, Pharmacy: [facility] [address] [facility] Blood-Glucose Meter monitoring kit Use as directed. Dx: E11.9  
  
 glucose blood VI test strips strip Commonly known as:  blood glucose test  
Use BID Dx11.9  
  
 ketoconazole 2 % topical cream  
Commonly known as:  NIZORAL Apply  to affected area daily. Lancets Misc Use as BID. Dx: E11.9  
  
 metFORMIN  mg tablet Commonly known as:  GLUCOPHAGE XR Take 1 Tab by mouth two (2) times a day. mometasone 50 mcg/actuation nasal spray Commonly known as:  NASONEX  
by Nasal route. multivitamin tablet Commonly known as:  ONE A DAY Take 1 Tab by mouth daily. sertraline 50 mg tablet Commonly known as:  ZOLOFT Take 1 Tab by mouth daily. Prescriptions Sent to Pharmacy Refills Blood-Glucose Meter monitoring kit 0 Sig: Use as directed. Dx: E11.9  Class: Normal  
 Pharmacy: [facility] [address] Ph #: [phone]  
 glucose blood VI test strips (BLOOD GLUCOSE TEST) strip 11  
 Sig: Use BID Dx11.9 Class: Normal  
 Pharmacy: [facility] [address], [address] [place] [address] Road Ph #: [phone] Lancets misc 11 Sig: Use as BID. Dx: E11.9 Class: Normal  
 Pharmacy: [facility] [address] Ph #: [phone]  
 ketoconazole (NIZORAL) 2 % topical cream 1 Sig: Apply  to affected area daily. Class: Normal  
 Pharmacy: [facility] [address], [address] [place] [address] Ph #: [phone] Route: Topical  
 metFORMIN ER (GLUCOPHAGE XR) 750 mg tablet 5 Sig: Take 1 Tab by mouth two (2) times a day. Class: Normal  
 Pharmacy: [facility] [address], [address] [place] [address] Ph #: [phone] Route: Oral  
  
We Performed the Following AMB POC HEMOGLOBIN A1C [12360 CPT(R)] AMB POC LIPID PROFILE [05470 CPT(R)] Follow-up Instructions Return in about 3 months (around 12/13/2018) for DM. Introducing [facility]! Allison V[employer] introduces [employer] patient portal. Now you can access parts of your medical record, email your doctor's office, and request medication refills online. 1. In your internet browser, go to [email or web address] 2. Click on the First Time User? Click Here link in the Sign In box. You will see the New Member Sign Up page. 3. Enter your [employer] Access Code exactly as it appears below. You will not need to use this code after youve completed the sign-up process. If you do not sign up before the expiration date, you must request a new code. · [employer] Access Code: [email or web address] Expires: 12/12/2018 12:12 PM 
 
4. Enter the last four digits of your Social Security Number (xxxx) and Date of Birth (mm/dd/yyyy) as indicated and click Submit. You will be taken to the next sign-up page. 5. Create a [employer] ID. This will be your [employer] login ID and cannot be changed, so think of one that is secure and easy to remember. 6. Create a [employer] password. You can change your password at any time. 7. Enter your Password Reset Question and Answer. This can be used at a later time if you forget your password. 8. Enter your e-mail address. You will receive e-mail notification when new information is available in [address] 9. Click Sign Up. You can now view and download portions of your medical record. 10. Click the Download Summary menu link to download a portable copy of your medical information. If you have questions, please visit the Frequently Asked Questions section of the [employer] website. Remember, [employer] is NOT to be used for urgent needs. For medical emergencies, dial 911. Now available from your iPhone and Android! Please provide this summary of care documentation to your next provider. Your primary care clinician is listed as Laureano Good. If you have any questions after today's visit, please call [phone].

## 2018-09-13 NOTE — PROGRESS NOTES
Chief Complaint Patient presents with  Rash  
  under breast  
 Dizziness 1. Have you been to the ER, urgent care clinic since your last visit? Hospitalized since your last visit? No 
 
2. Have you seen or consulted any other health care providers outside of the [address] [place] [address] since your last visit? Include any pap smears or colon screening.  No

## 2018-09-13 NOTE — PROGRESS NOTES
Carly Carmichael is a 52 y.o. female and presents with Rash (under breast) and Dizziness Karsten Knott Subjective: 
 
Pt is accompanied by a different daughter this visit. Pt relays she is walking regularly Anxiety d/o-pt relays she feels much better on the sertraline. Her daughter relays that the family has noticed a big difference in pts affect Type 2 DM- Lab Results Component Value Date/Time Hemoglobin A1c (POC) 7.0 09/13/2018 12:00 PM  
 Hemoglobin A1c, External 7.4 04/04/2018 Morbid obesity Wt Readings from Last 3 Encounters:  
09/13/18 220 lb (99.8 kg) 04/04/18 229 lb (103.9 kg) 01/10/18 228 lb (103.4 kg) Pt w c/o itchy rash under left breast. She has been using an otc cortisone cream w worsening sx. Pt relays she gets a similar rash in her groin area Review of Systems Constitutional: negative for fevers, chills, anorexia and weight loss Eyes:   negative for visual disturbance and irritation ENT:   positive for tinnitus,sore throat,nasal congestion,ear pains. hoarseness Respiratory:  negative for cough, hemoptysis, dyspnea,wheezing CV:   negative for chest pain, palpitations, lower extremity edema GI:   negative for nausea, vomiting, diarrhea, abdominal pain,melena Musculoskel: positive for myalgias, arthralgias, back pain, muscle weakness, joint pain Neurological:  negative for headaches, dizziness, vertigo, memory problems and gait Behavl/Psych: positive for improved feelings of anxiety, depression, mood changes Past Medical History:  
Diagnosis Date  Asthma  GERD (gastroesophageal reflux disease)  Headache(784.0)   
 migraine History reviewed. No pertinent surgical history. Social History Social History  Marital status: UNKNOWN Spouse name: N/A  
 Number of children: N/A  
 Years of education: N/A Social History Main Topics  Smoking status: Never Smoker  Smokeless tobacco: Never Used  Alcohol use No  
 Drug use:  No  
  Sexual activity: Yes Other Topics Concern  None Social History Narrative Family History Problem Relation Age of Onset  Diabetes Mother  Hypertension Mother  Heart Disease Father  Cancer Father  No Known Problems Sister  No Known Problems Brother  No Known Problems Maternal Grandmother  No Known Problems Maternal Grandfather  No Known Problems Paternal Grandmother  No Known Problems Paternal Grandfather  No Known Problems Brother  No Known Problems Brother Current Outpatient Prescriptions Medication Sig Dispense Refill  Blood-Glucose Meter monitoring kit Use as directed. Dx: E11.9 1 Kit 0  
 glucose blood VI test strips (BLOOD GLUCOSE TEST) strip Use BID Dx11.9 100 Strip 11  Lancets misc Use as BID. Dx: E11.9 100 Each 11  
 ketoconazole (NIZORAL) 2 % topical cream Apply  to affected area daily. 60 g 1  
 metFORMIN ER (GLUCOPHAGE XR) 750 mg tablet Take 1 Tab by mouth two (2) times a day. 60 Tab 5  sertraline (ZOLOFT) 50 mg tablet Take 1 Tab by mouth daily. 30 Tab 5  
 multivitamin (ONE A DAY) tablet Take 1 Tab by mouth daily. 30 Tab 6  
 albuterol (PROVENTIL HFA, VENTOLIN HFA, PROAIR HFA) 90 mcg/actuation inhaler  
= 2 PUFF each dose, Inhalation, every 4 hours, PRN: as needed for wheezing or cough, # 1 EA, 3 Refills, Pharmacy: [facility]  mometasone (NASONEX) 50 mcg/actuation nasal spray by Nasal route. Allergies Allergen Reactions  Amoxicillin Cough Objective: 
Visit Vitals  /71 (BP 1 Location: Left arm, BP Patient Position: Sitting)  Pulse 72  Temp 97.6 °F (36.4 °C) (Oral)  Resp 18  Ht 5' 4\" (1.626 m)  Wt 220 lb (99.8 kg)  LMP 08/01/2008  SpO2 97%  BMI 37.76 kg/m2 Physical Exam:  
General appearance - alert, obese, pleasant intermit tearful Mental status - alert, oriented to person, place, and time EYE-EOMI 
ENT-ENT exam normal, no neck nodes or sinus tenderness Mouth - mucous membranes moist, pharynx normal without lesions Neck - supple, no significant adenopathy Chest - clear to auscultation, no wheezes, rales or rhonchi, symmetric air entry Heart - normal rate, regular rhythm, normal S1, S2, no murmurs, rubs, clicks or gallops Abdomen - soft, obese Ext-peripheral pulses normal, no pedal edema, no clubbing or cyanosis Skin-faint, pink rash under left breast w irreg boreder. Neuro -alert, oriented, normal speech, no focal findings or movement disorder noted Results for orders placed or performed in visit on 09/13/18 AMB POC HEMOGLOBIN A1C Result Value Ref Range Hemoglobin A1c (POC) 7.0 % AMB POC LIPID PROFILE Result Value Ref Range Cholesterol (POC) 225 Triglycerides (POC) 321 HDL Cholesterol (POC) 46 LDL Cholesterol (POC) 115 MG/DL Non-HDL Goal (POC) 179 TChol/HDL Ratio (POC) 2.5 Assessment/Plan: ICD-10-CM ICD-9-CM 1. Type 2 diabetes mellitus without complication, without long-term current use of insulin ([facility]) E11.9 250.00 AMB POC HEMOGLOBIN A1C AMB POC LIPID PROFILE Blood-Glucose Meter monitoring kit  
   glucose blood VI test strips (BLOOD GLUCOSE TEST) strip Lancets misc  
   metFORMIN ER (GLUCOPHAGE XR) 750 mg tablet 2. Candidal skin infection B37.2 112.3 ketoconazole (NIZORAL) 2 % topical cream  
3. Morbid obesity ([facility] [address] E66.01 278.01   
4. Generalized anxiety disorder F41.1 300.02 Orders Placed This Encounter  AMB POC HEMOGLOBIN A1C  
 AMB POC LIPID PROFILE  Blood-Glucose Meter monitoring kit Sig: Use as directed. Dx: E11.9 Dispense:  1 Kit Refill:  0  
 glucose blood VI test strips (BLOOD GLUCOSE TEST) strip Sig: Use BID Dx11.9 Dispense:  100 Strip Refill:  11  Lancets misc Sig: Use as BID. Dx: E11.9 Dispense:  100 Each Refill:  11  
 ketoconazole (NIZORAL) 2 % topical cream  
  Sig: Apply  to affected area daily. Dispense:  60 g Refill:  1  
 metFORMIN ER (GLUCOPHAGE XR) 750 mg tablet Sig: Take 1 Tab by mouth two (2) times a day. Dispense:  60 Tab Refill:  5 *HIGHER DOSE 1. Type 2 diabetes mellitus without complication, without long-term current use of insulin ([address] Improved Will increase metformin w goal 2gm/day - AMB POC HEMOGLOBIN A1C 
- AMB POC LIPID PROFILE 
- Blood-Glucose Meter monitoring kit; Use as directed. Dx: E11.9  Dispense: 1 Kit; Refill: 0 
- glucose blood VI test strips (BLOOD GLUCOSE TEST) strip; Use BID Dx11.9  Dispense: 100 Strip; Refill: 11 
- Lancets misc; Use as BID. Dx: E11.9  Dispense: 100 Each; Refill: 11 
- metFORMIN ER (GLUCOPHAGE XR) 750 mg tablet; Take 1 Tab by mouth two (2) times a day. Dispense: 60 Tab; Refill: 5 
 
2. Candidal skin infection 
 
- ketoconazole (NIZORAL) 2 % topical cream; Apply  to affected area daily. Dispense: 60 g; Refill: 1 3. Morbid obesity ([address] Cont wt loss! 4. Generalized anxiety disorder Improved Cont sertraline There are no Patient Instructions on file for this visit. Follow-up Disposition: 
Return in about 3 months (around 12/13/2018) for DM. I have reviewed with the patient details of the assessment and plan and all questions were answered. Relevent patient education was performed. The most recent lab findings were reviewed with the patient. An After Visit Summary was printed and given to the patient.

## 2018-10-04 ENCOUNTER — TELEPHONE (OUTPATIENT)
Dept: INTERNAL MEDICINE CLINIC | Age: 53
End: 2018-10-04

## 2018-10-04 NOTE — TELEPHONE ENCOUNTER
Patient wants to talk to someone about the metformin, she not sure but she is always sleepy and very tried all the time now, not sure if it's the meds or not

## 2018-10-09 NOTE — TELEPHONE ENCOUNTER
Spoke to pt I did tell pt that the medication do not cause drowsiness, pt verbalized understanding. I did advise pt is she is still feeling very tired through out the day to contact the office so an appointment can be scheduled.

## 2018-10-16 DIAGNOSIS — Z92.89 HISTORY OF POSITIVE PPD: Primary | ICD-10-CM

## 2018-11-05 ENCOUNTER — TELEPHONE (OUTPATIENT)
Dept: INTERNAL MEDICINE CLINIC | Age: 53
End: 2018-11-05

## 2018-12-24 DIAGNOSIS — Z92.89 HISTORY OF POSITIVE PPD: ICD-10-CM

## 2019-01-11 ENCOUNTER — OFFICE VISIT (OUTPATIENT)
Dept: INTERNAL MEDICINE CLINIC | Age: 54
End: 2019-01-11

## 2019-01-11 VITALS
RESPIRATION RATE: 18 BRPM | SYSTOLIC BLOOD PRESSURE: 118 MMHG | DIASTOLIC BLOOD PRESSURE: 78 MMHG | BODY MASS INDEX: 37.05 KG/M2 | HEIGHT: 64 IN | HEART RATE: 86 BPM | WEIGHT: 217 LBS | OXYGEN SATURATION: 94 % | TEMPERATURE: 97.1 F

## 2019-01-11 DIAGNOSIS — M54.16 LUMBAR RADICULOPATHY: Primary | ICD-10-CM

## 2019-01-11 RX ORDER — CYCLOBENZAPRINE HCL 10 MG
10 TABLET ORAL
Qty: 30 TAB | Refills: 1 | Status: SHIPPED | OUTPATIENT
Start: 2019-01-11

## 2019-01-11 RX ORDER — IBUPROFEN 600 MG/1
TABLET ORAL
COMMUNITY

## 2019-01-11 RX ORDER — PREDNISONE 20 MG/1
40 TABLET ORAL
Qty: 10 TAB | Refills: 0 | Status: SHIPPED | OUTPATIENT
Start: 2019-01-11 | End: 2019-03-20

## 2019-01-11 NOTE — PROGRESS NOTES
Chief Complaint Patient presents with  Transitions Of Care 1. Have you been to the ER, urgent care clinic since your last visit? Hospitalized since your last visit? Yes When: [facility] for left sided pain 2. Have you seen or consulted any other health care providers outside of the [address] [place] [address] since your last visit? Include any pap smears or colon screening.  No

## 2019-01-11 NOTE — PROGRESS NOTES
Blanca Mccormick is a 62 y.o. female and presents with Transitions Of Care Osbaldo Hoover Subjective: 
Pt comes-in for ED f/u. Pt went to [facility] ED a few days ago w left sided pain w radiation to groin area x 1 mth after doing laundry. KUB was done-negative. Pt was given NSAIDS and to f/u w PCP Pt denies le weakness, bowel or bladder dysfxn. Pt has known lumbar degen disc dz via MRI done in the past. 
[facility] ED note reviewed. Anxiety d/o-improved on sertraline Type 2 DM- Lab Results Component Value Date/Time Hemoglobin A1c (POC) 7.0 09/13/2018 12:00 PM  
 Hemoglobin A1c, External 7.4 04/04/2018 Morbid obesity Wt Readings from Last 3 Encounters:  
01/11/19 217 lb (98.4 kg) 09/13/18 220 lb (99.8 kg) 04/04/18 229 lb (103.9 kg) Review of Systems Constitutional: negative for fevers, chills, anorexia and weight loss Eyes:   negative for visual disturbance and irritation ENT:   positive for tinnitus,sore throat,nasal congestion,ear pains. hoarseness Respiratory:  negative for cough, hemoptysis, dyspnea,wheezing CV:   negative for chest pain, palpitations, lower extremity edema GI:   negative for nausea, vomiting, diarrhea, abdominal pain,melena Musculoskel: positive for myalgias, arthralgias, back pain, muscle weakness, joint pain Neurological:  negative for headaches, dizziness, vertigo, memory problems and gait Behavl/Psych: positive for improved feelings of anxiety, depression, mood changes Past Medical History:  
Diagnosis Date  Asthma  GERD (gastroesophageal reflux disease)  Headache(784.0)   
 migraine History reviewed. No pertinent surgical history. Social History Socioeconomic History  Marital status: UNKNOWN Spouse name: Not on file  Number of children: Not on file  Years of education: Not on file  Highest education level: Not on file Tobacco Use  Smoking status: Never Smoker  Smokeless tobacco: Never Used Substance and Sexual Activity  Alcohol use: No  
 Drug use: No  
 Sexual activity: Yes Family History Problem Relation Age of Onset  Diabetes Mother  Hypertension Mother  Heart Disease Father  Cancer Father  No Known Problems Sister  No Known Problems Brother  No Known Problems Maternal Grandmother  No Known Problems Maternal Grandfather  No Known Problems Paternal Grandmother  No Known Problems Paternal Grandfather  No Known Problems Brother  No Known Problems Brother Current Outpatient Medications Medication Sig Dispense Refill  ibuprofen (MOTRIN) 600 mg tablet Take  by mouth every six (6) hours as needed for Pain.  predniSONE (DELTASONE) 20 mg tablet Take 40 mg by mouth daily (with breakfast). 10 Tab 0  cyclobenzaprine (FLEXERIL) 10 mg tablet Take 1 Tab by mouth three (3) times daily as needed for Muscle Spasm(s). 30 Tab 1  Blood-Glucose Meter monitoring kit Use as directed. Dx: E11.9 1 Kit 0  
 glucose blood VI test strips (BLOOD GLUCOSE TEST) strip Use BID Dx11.9 100 Strip 11  Lancets misc Use as BID. Dx: E11.9 100 Each 11  
 ketoconazole (NIZORAL) 2 % topical cream Apply  to affected area daily. 60 g 1  
 metFORMIN ER (GLUCOPHAGE XR) 750 mg tablet Take 1 Tab by mouth two (2) times a day. 60 Tab 5  sertraline (ZOLOFT) 50 mg tablet Take 1 Tab by mouth daily. 30 Tab 5  
 multivitamin (ONE A DAY) tablet Take 1 Tab by mouth daily. 30 Tab 6  
 albuterol (PROVENTIL HFA, VENTOLIN HFA, PROAIR HFA) 90 mcg/actuation inhaler  
= 2 PUFF each dose, Inhalation, every 4 hours, PRN: as needed for wheezing or cough, # 1 EA, 3 Refills, Pharmacy: [facility]  mometasone (NASONEX) 50 mcg/actuation nasal spray by Nasal route. Allergies Allergen Reactions  Amoxicillin Cough Objective: 
Visit Vitals /78 (BP 1 Location: Left arm, BP Patient Position: Sitting) Pulse 86 Temp 97.1 °F (36.2 °C) (Oral) Resp 18 Ht 5' 4\" (1.626 m) Wt 217 lb (98.4 kg) LMP 08/01/2008 SpO2 94% BMI 37.25 kg/m² Physical Exam:  
General appearance - alert, obese, pleasant intermit tearful Mental status - alert, oriented to person, place, and time EYE-EOMI 
ENT-ENT exam normal, no neck nodes or sinus tenderness Neck - supple, no significant adenopathy Chest - clear to auscultation, no wheezes, rales or rhonchi, symmetric air entry Heart - normal rate, regular rhythm, normal S1, S2 Abdomen - soft, obese Ext-peripheral pulses normal, no pedal edema, no clubbing or cyanosis 
 -unable to complete straight leg test due to pts discomfort Neuro -alert, oriented, normal speech, no focal findings or movement disorder noted Results for orders placed or performed in visit on 09/13/18 AMB POC HEMOGLOBIN A1C Result Value Ref Range Hemoglobin A1c (POC) 7.0 % AMB POC LIPID PROFILE Result Value Ref Range Cholesterol (POC) 225 Triglycerides (POC) 321 HDL Cholesterol (POC) 46 LDL Cholesterol (POC) 115 MG/DL Non-HDL Goal (POC) 179 TChol/HDL Ratio (POC) 2.5 Assessment/Plan: ICD-10-CM ICD-9-CM 1. Lumbar radiculopathy M54.16 724. 4 predniSONE (DELTASONE) 20 mg tablet  
   cyclobenzaprine (FLEXERIL) 10 mg tablet REFERRAL TO PHYSICAL THERAPY Orders Placed This Encounter  REFERRAL TO PHYSICAL THERAPY Referral Priority:   Routine Referral Type:   PT/OT/ST Referral Reason:   Specialty Services Required Number of Visits Requested:   1  ibuprofen (MOTRIN) 600 mg tablet Sig: Take  by mouth every six (6) hours as needed for Pain.  predniSONE (DELTASONE) 20 mg tablet Sig: Take 40 mg by mouth daily (with breakfast). Dispense:  10 Tab Refill:  0  
 cyclobenzaprine (FLEXERIL) 10 mg tablet Sig: Take 1 Tab by mouth three (3) times daily as needed for Muscle Spasm(s). Dispense:  30 Tab Refill:  1 MAY CAUSE DROWSINESS 1. Lumbar radiculopathy - predniSONE (DELTASONE) 20 mg tablet; Take 40 mg by mouth daily (with breakfast). Dispense: 10 Tab; Refill: 0 
- cyclobenzaprine (FLEXERIL) 10 mg tablet; Take 1 Tab by mouth three (3) times daily as needed for Muscle Spasm(s). Dispense: 30 Tab; Refill: 1 
- REFERRAL TO PHYSICAL THERAPY Patient Instructions Sciatica: Care Instructions Your Care Instructions Sciatica (say \"hme-KU-nh-kuh\") is an irritation of one of the sciatic nerves, which come from the spinal cord in the lower back. The sciatic nerves and their branches extend down through the buttock to the foot. Sciatica can develop when an injured disc in the back presses against a spinal nerve root. Its main symptom is pain, numbness, or weakness that is often worse in the leg or foot than in the back. Sciatica often will improve and go away with time. Early treatment usually includes medicines and exercises to relieve pain. Follow-up care is a key part of your treatment and safety. Be sure to make and go to all appointments, and call your doctor if you are having problems. It's also a good idea to know your test results and keep a list of the medicines you take. How can you care for yourself at home? · Take pain medicines exactly as directed. ? If the doctor gave you a prescription medicine for pain, take it as prescribed. ? If you are not taking a prescription pain medicine, ask your doctor if you can take an over-the-counter medicine. · Use heat or ice to relieve pain. ? To apply heat, put a warm water bottle, heating pad set on low, or warm cloth on your back. Do not go to sleep with a heating pad on your skin. ? To use ice, put ice or a cold pack on the area for 10 to 20 minutes at a time. Put a thin cloth between the ice and your skin. · Avoid sitting if possible, unless it feels better than standing. · Alternate lying down with short walks.  Increase your walking distance as you are able to without making your symptoms worse. · Do not do anything that makes your symptoms worse. When should you call for help? Call 911 anytime you think you may need emergency care. For example, call if: 
  · You are unable to move a leg at all.  
[facility] your doctor now or seek immediate medical care if: 
  · You have new or worse symptoms in your legs or buttocks. Symptoms may include: 
? Numbness or tingling. ? Weakness. ? Pain.  
  · You lose bladder or bowel control.  
 Watch closely for changes in your health, and be sure to contact your doctor if: 
  · You are not getting better as expected. Where can you learn more? Go to http://carolynnMake It Workloulou.info/. Enter 802-020-4089 in the search box to learn more about \"Sciatica: Care Instructions. \" Current as of: November 29, 2017 Content Version: 11.8 © 8184-3391 [employer]. Care instructions adapted under license by [employer] (which disclaims liability or warranty for this information). If you have questions about a medical condition or this instruction, always ask your healthcare professional. [place] [address] any warranty or liability for your use of this information. Follow-up Disposition: 
Return in about 6 weeks (around 2/22/2019) for DM f/u 30 minute appt. I have reviewed with the patient details of the assessment and plan and all questions were answered. Relevent patient education was performed. The most recent lab findings were reviewed with the patient. An After Visit Summary was printed and given to the patient.

## 2019-01-11 NOTE — PATIENT INSTRUCTIONS
Sciatica: Care Instructions Your Care Instructions Sciatica (say \"scl-MA-rm-kuh\") is an irritation of one of the sciatic nerves, which come from the spinal cord in the lower back. The sciatic nerves and their branches extend down through the buttock to the foot. Sciatica can develop when an injured disc in the back presses against a spinal nerve root. Its main symptom is pain, numbness, or weakness that is often worse in the leg or foot than in the back. Sciatica often will improve and go away with time. Early treatment usually includes medicines and exercises to relieve pain. Follow-up care is a key part of your treatment and safety. Be sure to make and go to all appointments, and call your doctor if you are having problems. It's also a good idea to know your test results and keep a list of the medicines you take. How can you care for yourself at home? · Take pain medicines exactly as directed. ? If the doctor gave you a prescription medicine for pain, take it as prescribed. ? If you are not taking a prescription pain medicine, ask your doctor if you can take an over-the-counter medicine. · Use heat or ice to relieve pain. ? To apply heat, put a warm water bottle, heating pad set on low, or warm cloth on your back. Do not go to sleep with a heating pad on your skin. ? To use ice, put ice or a cold pack on the area for 10 to 20 minutes at a time. Put a thin cloth between the ice and your skin. · Avoid sitting if possible, unless it feels better than standing. · Alternate lying down with short walks. Increase your walking distance as you are able to without making your symptoms worse. · Do not do anything that makes your symptoms worse. When should you call for help? Call 911 anytime you think you may need emergency care. For example, call if: 
  · You are unable to move a leg at all.  
[facility] your doctor now or seek immediate medical care if:   · You have new or worse symptoms in your legs or buttocks. Symptoms may include: 
? Numbness or tingling. ? Weakness. ? Pain.  
  · You lose bladder or bowel control.  
 Watch closely for changes in your health, and be sure to contact your doctor if: 
  · You are not getting better as expected. Where can you learn more? Go to http://carolynn-loulou.info/. Enter [phone] in the search box to learn more about \"Sciatica: Care Instructions. \" Current as of: November 29, 2017 Content Version: 11.8 © 3214-2018 [employer]. Care instructions adapted under license by [employer] (which disclaims liability or warranty for this information). If you have questions about a medical condition or this instruction, always ask your healthcare professional. [address] any warranty or liability for your use of this information.

## 2019-03-20 ENCOUNTER — HOSPITAL ENCOUNTER (OUTPATIENT)
Dept: DIABETES SERVICES | Age: 54
Discharge: HOME | End: 2019-03-20
Payer: MEDICAID

## 2019-03-20 ENCOUNTER — OFFICE VISIT (OUTPATIENT)
Dept: INTERNAL MEDICINE CLINIC | Age: 54
End: 2019-03-20

## 2019-03-20 VITALS
TEMPERATURE: 98.6 F | RESPIRATION RATE: 19 BRPM | SYSTOLIC BLOOD PRESSURE: 118 MMHG | BODY MASS INDEX: 37.9 KG/M2 | WEIGHT: 222 LBS | HEART RATE: 80 BPM | HEIGHT: 64 IN | OXYGEN SATURATION: 97 % | DIASTOLIC BLOOD PRESSURE: 73 MMHG

## 2019-03-20 DIAGNOSIS — E66.01 MORBID OBESITY (HCC): ICD-10-CM

## 2019-03-20 DIAGNOSIS — G47.33 OSA (OBSTRUCTIVE SLEEP APNEA): ICD-10-CM

## 2019-03-20 DIAGNOSIS — E11.9 TYPE 2 DIABETES MELLITUS WITHOUT COMPLICATION, WITHOUT LONG-TERM CURRENT USE OF INSULIN (HCC): Primary | ICD-10-CM

## 2019-03-20 DIAGNOSIS — F41.1 GENERALIZED ANXIETY DISORDER: ICD-10-CM

## 2019-03-20 DIAGNOSIS — Z87.898 H/O MULTIPLE PULMONARY NODULES: ICD-10-CM

## 2019-03-20 DIAGNOSIS — N83.202 CYST OF LEFT OVARY: ICD-10-CM

## 2019-03-20 DIAGNOSIS — M79.674 PAIN OF TOE OF RIGHT FOOT: ICD-10-CM

## 2019-03-20 DIAGNOSIS — Z12.39 BREAST CANCER SCREENING: ICD-10-CM

## 2019-03-20 LAB
CHOLEST SERPL-MCNC: 207 MG/DL
GLUCOSE POC: 143 MG/DL
HBA1C MFR BLD HPLC: 6.5 %
HDLC SERPL-MCNC: 49 MG/DL
LDL CHOLESTEROL POC: 107 MG/DL
TCHOL/HDL RATIO (POC): 2.2
TRIGL SERPL-MCNC: 257 MG/DL
VLDLC SERPL CALC-MCNC: 158 MG/DL

## 2019-03-20 PROCEDURE — G0109 DIAB MANAGE TRN IND/GROUP: HCPCS

## 2019-03-20 RX ORDER — SERTRALINE HYDROCHLORIDE 50 MG/1
50 TABLET, FILM COATED ORAL DAILY
Qty: 90 TAB | Refills: 1 | Status: SHIPPED | OUTPATIENT
Start: 2019-03-20

## 2019-03-20 RX ORDER — METFORMIN HYDROCHLORIDE 750 MG/1
750 TABLET, EXTENDED RELEASE ORAL 2 TIMES DAILY
Qty: 180 TAB | Refills: 1 | Status: SHIPPED | OUTPATIENT
Start: 2019-03-20

## 2019-03-20 NOTE — PROGRESS NOTES
Chief Complaint Patient presents with  Diabetes 1. Have you been to the ER, urgent care clinic since your last visit? Hospitalized since your last visit? No 
 
2. Have you seen or consulted any other health care providers outside of the [address] [place] [address] since your last visit? Include any pap smears or colon screening.  No 
 
 
[facility] amb A1C, Glucose and Lipid Panel Dr. Lucius Jarrell, LPN

## 2019-03-20 NOTE — PATIENT INSTRUCTIONS

## 2019-03-20 NOTE — DIABETES MGMT
3/20/2019    Dear Mari Blanco MD,    Thank you for your kind referral. Your patient, Stacey Choi, attended Session #1 at [place] [address] where the following topics were covered today:    *Describing diabetes disease process and treatment options   *Incorporating nutrition management into their lifestyle   *Monitoring blood glucose and other parameters and interpreting and using the results for self   management decision making   *Preventing, detecting and treating acute complications   *Incorporating physical activity into lifestyle  *Using medications safely and for maximum therapeutic effectiveness   * Developing personal strategies to promote health and behavior change   *Developing personal strategies to address psychosocial issues and concerns    Data from first visit:  Weight: 3/20/2019 220.4#  HgbA1c: 3/20/2019 6.3%    Increased risk for diabetes: 5.7-6.4%, Diabetes >6.4%  Glycemic control for adults with diabetes: < 7%   Elderly or multiple medical conditions <8%    Random blood glucose: 3/20/2019 Pre-Meal 107 mg/dl  Meter given: Patient has meter   Goal(s) set :   Goal 1: Follow exercise plan - walk or bike for 30 minutes 2x/day (30 min in AM and 30 min. in PM)    Your patient will have two (2) additional appointments to complete the ordered education. Their next visit is scheduled for April 3, 2019. We look forward to assisting your patient in meeting their self-management goals.  If you have any questions, please do not hesitate to call the [facility] at [phone]    Sincerely, Ameya Harp, [address] [facility] ([place], [place]) [address] [facility], [address]  Phone: [phone] Fax: [phone]

## 2019-03-20 NOTE — PROGRESS NOTES
Mahsa Beach is a 62 y.o. female and presents with Diabetes Frutoso Golder Subjective: 
 
Pt comes-in for routine f/u. Pt is still having lt sided back pain w radiation to side. Pt had a scan done which demonstrated 4x3cm left ovarian cyst. Pt f/u w gyn and US was ordered. Pt has an appt elida for US, but will be unable to keep bc she is living in [facility] and [facility] w one of her daughters temporarily. Since her son , she is unable to tolerate living in the home he was raised in. Pt w c/o rt great toe pain after dropping a laptop on it 3 days ago Anxiety d/o-improved on sertraline Type 2 DM- Lab Results Component Value Date/Time Hemoglobin A1c (POC) 6.5 2019 02:00 PM  
 Hemoglobin A1c, External 7.4 2018 Morbid obesity Wt Readings from Last 3 Encounters:  
19 222 lb (100.7 kg) 19 217 lb (98.4 kg) 18 220 lb (99.8 kg) Review of Systems Constitutional: negative for fevers, chills, anorexia and weight loss Eyes:   negative for visual disturbance and irritation ENT:   positive for tinnitus,sore throat,nasal congestion,ear pains. hoarseness Respiratory:  negative for cough, hemoptysis, dyspnea,wheezing CV:   negative for chest pain, palpitations, lower extremity edema GI:   negative for nausea, vomiting, diarrhea, abdominal pain,melena Musculoskel: positive for myalgias, arthralgias, back pain, muscle weakness, joint pain Neurological:  negative for headaches, dizziness, vertigo, memory problems and gait Behavl/Psych: positive for improved feelings of anxiety, depression, mood changes Past Medical History:  
Diagnosis Date  Asthma  GERD (gastroesophageal reflux disease)  Headache(784.0)   
 migraine History reviewed. No pertinent surgical history. Social History Socioeconomic History  Marital status:  Spouse name: Not on file  Number of children: Not on file  Years of education: Not on file  Highest education level: Not on file Tobacco Use  Smoking status: Never Smoker  Smokeless tobacco: Never Used Substance and Sexual Activity  Alcohol use: No  
 Drug use: No  
 Sexual activity: Yes Family History Problem Relation Age of Onset  Diabetes Mother  Hypertension Mother  Heart Disease Father  Cancer Father  No Known Problems Sister  No Known Problems Brother  No Known Problems Maternal Grandmother  No Known Problems Maternal Grandfather  No Known Problems Paternal Grandmother  No Known Problems Paternal Grandfather  No Known Problems Brother  No Known Problems Brother Current Outpatient Medications Medication Sig Dispense Refill  metFORMIN ER (GLUCOPHAGE XR) 750 mg tablet Take 1 Tab by mouth two (2) times a day. 180 Tab 1  
 sertraline (ZOLOFT) 50 mg tablet Take 1 Tab by mouth daily. 90 Tab 1  ibuprofen (MOTRIN) 600 mg tablet Take  by mouth every six (6) hours as needed for Pain.  cyclobenzaprine (FLEXERIL) 10 mg tablet Take 1 Tab by mouth three (3) times daily as needed for Muscle Spasm(s). 30 Tab 1  
 glucose blood VI test strips (BLOOD GLUCOSE TEST) strip Use BID Dx11.9 100 Strip 11  Lancets misc Use as BID. Dx: E11.9 100 Each 11  
 ketoconazole (NIZORAL) 2 % topical cream Apply  to affected area daily. 60 g 1  
 albuterol (PROVENTIL HFA, VENTOLIN HFA, PROAIR HFA) 90 mcg/actuation inhaler  
= 2 PUFF each dose, Inhalation, every 4 hours, PRN: as needed for wheezing or cough, # 1 EA, 3 Refills, Pharmacy: [address]  mometasone (NASONEX) 50 mcg/actuation nasal spray by Nasal route.  multivitamin (ONE A DAY) tablet Take 1 Tab by mouth daily. 30 Tab 6 Allergies Allergen Reactions  Amoxicillin Cough Objective: 
Visit Vitals /73 (BP 1 Location: Left arm, BP Patient Position: Sitting) Pulse 80 Temp 98.6 °F (37 °C) (Oral) Resp 19 Ht 5' 4\" (1.626 m) Wt 222 lb (100.7 kg) LMP 08/01/2008 SpO2 97% BMI 38.11 kg/m² Physical Exam:  
General appearance - alert, obese, pleasant inimproved mood Mental status - alert, oriented to person, place, and time EYE-EOMI 
ENT-ENT exam normal, no neck nodes or sinus tenderness Neck - supple, no significant adenopathy Chest - clear to auscultation, no wheezes, rales or rhonchi, symmetric air entry Heart - normal rate, regular rhythm, normal S1, S2 Abdomen - soft, obese Ext-+ swelling and tenderness rt great toe w subungual bruise Neuro -alert, oriented, normal speech, no focal findings or movement disorder noted Results for orders placed or performed in visit on 03/20/19 AMB POC HEMOGLOBIN A1C Result Value Ref Range Hemoglobin A1c (POC) 6.5 % AMB POC GLUCOSE BLOOD, BY GLUCOSE MONITORING DEVICE Result Value Ref Range Glucose  mg/dL AMB POC LIPID PROFILE Result Value Ref Range Cholesterol (POC) 207 Triglycerides (POC) 257 HDL Cholesterol (POC) 49 VLDL (POC) 158 MG/DL  
 LDL Cholesterol (POC) 107 MG/DL TChol/HDL Ratio (POC) 2.2 Assessment/Plan: ICD-10-CM ICD-9-CM 1. Type 2 diabetes mellitus without complication, without long-term current use of insulin (HCC) E11.9 250.00 AMB POC HEMOGLOBIN A1C AMB POC GLUCOSE BLOOD, BY GLUCOSE MONITORING DEVICE AMB POC LIPID PROFILE  
   metFORMIN ER (GLUCOPHAGE XR) 750 mg tablet REFERRAL TO OPHTHALMOLOGY 2. H/O multiple pulmonary nodules Z87.898 V12.69 CT CHEST WO CONT 3. Breast cancer screening Z12.31 V76.10 BLAINE 3D JR W MAMMO BI DX INCL CAD 4. Pain of toe of right foot M79.674 729.5 XR FOOT RT MIN 3 V  
5. Generalized anxiety disorder F41.1 300.02 sertraline (ZOLOFT) 50 mg tablet 6. Morbid obesity ([address] E66.01 278.01   
7. AGNIESZKA (obstructive sleep apnea) G47.33 327.23   
8. Cyst of left ovary N83.202 620.2 Orders Placed This Encounter  BLAINE 3D JR W MAMMO BI DX INCL CAD   To be done at [facility]  
 Standing Status:   Future Standing Expiration Date:   9/20/2019 Order Specific Question:   Reason for Exam  
  Answer:   breast ca screening  CT CHEST WO CONT  
  TO BE DONE AT [facility] Standing Status:   Future Standing Expiration Date:   9/20/2019 Order Specific Question:   Is Patient Allergic to Contrast Dye? Answer:   Unknown  XR FOOT RT MIN 3 V  
  TO BE DONE AT [facility] Standing Status:   Future Standing Expiration Date:   4/20/2019 Order Specific Question:   Reason for Exam  
  Answer:   s/p trauma great toe w pain and swelling r/o fx  REFERRAL TO OPHTHALMOLOGY Referral Priority:   Routine Referral Type:   Consultation Referral Reason:   Specialty Services Required Requested Specialty:   Ophthalmology Number of Visits Requested:   1  AMB POC HEMOGLOBIN A1C  
 AMB POC GLUCOSE BLOOD, BY GLUCOSE MONITORING DEVICE  
 AMB POC LIPID PROFILE  metFORMIN ER (GLUCOPHAGE XR) 750 mg tablet Sig: Take 1 Tab by mouth two (2) times a day. Dispense:  180 Tab Refill:  1 *HIGHER DOSE  sertraline (ZOLOFT) 50 mg tablet Sig: Take 1 Tab by mouth daily. Dispense:  90 Tab Refill:  1 1. Type 2 diabetes mellitus without complication, without long-term current use of insulin ([address] Improved! 
- AMB POC HEMOGLOBIN A1C 
- AMB POC GLUCOSE BLOOD, BY GLUCOSE MONITORING DEVICE 
- AMB POC LIPID PROFILE 
- metFORMIN ER (GLUCOPHAGE XR) 750 mg tablet; Take 1 Tab by mouth two (2) times a day. Dispense: 180 Tab; Refill: 1 
- REFERRAL TO OPHTHALMOLOGY 2. H/O multiple pulmonary nodules - CT CHEST WO CONT; Future 3. Breast cancer screening - [facility] 3D JR W MAMMO BI DX INCL CAD; Future 4. Pain of toe of right foot - XR FOOT RT MIN 3 V; Future 5. Generalized anxiety disorder 
 
- sertraline (ZOLOFT) 50 mg tablet; Take 1 Tab by mouth daily. Dispense: 90 Tab; Refill: 1 6. Morbid obesity ([address] D/w pt portion sizes, daily walking and healthy meals 7. AGNIESZKA (obstructive sleep apnea) Pt does not have a CPAP due to insurance 8. Cyst of left ovary F/u US and gyn Patient Instructions Learning About Diabetes Food Guidelines Your Care Instructions Meal planning is important to manage diabetes. It helps keep your blood sugar at a target level (which you set with your doctor). You don't have to eat special foods. You can eat what your family eats, including sweets once in a while. But you do have to pay attention to how often you eat and how much you eat of certain foods. You may want to work with a dietitian or a certified diabetes educator (CDE) to help you plan meals and snacks. A dietitian or CDE can also help you lose weight if that is one of your goals. What should you know about eating carbs? Managing the amount of carbohydrate (carbs) you eat is an important part of healthy meals when you have diabetes. Carbohydrate is found in many foods. · Learn which foods have carbs. And learn the amounts of carbs in different foods. ? Bread, cereal, pasta, and rice have about 15 grams of carbs in a serving. A serving is 1 slice of bread (1 ounce), ½ cup of cooked cereal, or 1/3 cup of cooked pasta or rice. ? Fruits have 15 grams of carbs in a serving. A serving is 1 small fresh fruit, such as an apple or orange; ½ of a banana; ½ cup of cooked or canned fruit; ½ cup of fruit juice; 1 cup of melon or raspberries; or 2 tablespoons of dried fruit. ? Milk and no-sugar-added yogurt have 15 grams of carbs in a serving. A serving is 1 cup of milk or 2/3 cup of no-sugar-added yogurt. ? Starchy vegetables have 15 grams of carbs in a serving. A serving is ½ cup of mashed potatoes or sweet potato; 1 cup winter squash; ½ of a small baked potato; ½ cup of cooked beans; or ½ cup cooked corn or green peas.  
· Learn how much carbs to eat each day and at each meal. A dietitian or CDE can teach you how to keep track of the amount of carbs you eat. This is called carbohydrate counting. · If you are not sure how to count carbohydrate grams, use the Plate Method to plan meals. It is a good, quick way to make sure that you have a balanced meal. It also helps you spread carbs throughout the day. ? Divide your plate by types of foods. Put non-starchy vegetables on half the plate, meat or other protein food on one-quarter of the plate, and a grain or starchy vegetable in the final quarter of the plate. To this you can add a small piece of fruit and 1 cup of milk or yogurt, depending on how many carbs you are supposed to eat at a meal. 
· Try to eat about the same amount of carbs at each meal. Do not \"save up\" your daily allowance of carbs to eat at one meal. 
· Proteins have very little or no carbs per serving. Examples of proteins are beef, chicken, turkey, fish, eggs, tofu, cheese, cottage cheese, and peanut butter. A serving size of meat is 3 ounces, which is about the size of a deck of cards. Examples of meat substitute serving sizes (equal to 1 ounce of meat) are 1/4 cup of cottage cheese, 1 egg, 1 tablespoon of peanut butter, and ½ cup of tofu. How can you eat out and still eat healthy? · Learn to estimate the serving sizes of foods that have carbohydrate. If you measure food at home, it will be easier to estimate the amount in a serving of restaurant food. · If the meal you order has too much carbohydrate (such as potatoes, corn, or baked beans), ask to have a low-carbohydrate food instead. Ask for a salad or green vegetables. · If you use insulin, check your blood sugar before and after eating out to help you plan how much to eat in the future. · If you eat more carbohydrate at a meal than you had planned, take a walk or do other exercise. This will help lower your blood sugar. What else should you know? · Limit saturated fat, such as the fat from meat and dairy products.  This is a healthy choice because people who have diabetes are at higher risk of heart disease. So choose lean cuts of meat and nonfat or low-fat dairy products. Use olive or canola oil instead of butter or shortening when cooking. · Don't skip meals. Your blood sugar may drop too low if you skip meals and take insulin or certain medicines for diabetes. · Check with your doctor before you drink alcohol. Alcohol can cause your blood sugar to drop too low. Alcohol can also cause a bad reaction if you take certain diabetes medicines. Follow-up care is a key part of your treatment and safety. Be sure to make and go to all appointments, and call your doctor if you are having problems. It's also a good idea to know your test results and keep a list of the medicines you take. Where can you learn more? Go to http://carolynn-loulou.info/. Enter I183 in the search box to learn more about \"Learning About Diabetes Food Guidelines. \" Current as of: July 25, 2018 Content Version: 11.9 © 8320-0990 [employer]. Care instructions adapted under license by [employer] (which disclaims liability or warranty for this information). If you have questions about a medical condition or this instruction, always ask your healthcare professional. [place] [address] any warranty or liability for your use of this information. Follow-up and Dispositions · Return in about 4 months (around 7/20/2019) for DM. I have reviewed with the patient details of the assessment and plan and all questions were answered. Relevent patient education was performed. The most recent lab findings were reviewed with the patient. An After Visit Summary was printed and given to the patient.

## 2019-03-21 PROBLEM — Z87.898 H/O MULTIPLE PULMONARY NODULES: Status: ACTIVE | Noted: 2019-03-21

## 2019-03-21 PROBLEM — N83.202 CYST OF LEFT OVARY: Status: ACTIVE | Noted: 2019-03-21

## 2019-03-26 DIAGNOSIS — E11.9 TYPE 2 DIABETES MELLITUS WITHOUT COMPLICATION, WITHOUT LONG-TERM CURRENT USE OF INSULIN (HCC): ICD-10-CM

## 2019-03-26 RX ORDER — LANCETS
EACH MISCELLANEOUS
Qty: 100 EACH | Refills: 11 | Status: SHIPPED | OUTPATIENT
Start: 2019-03-26

## 2019-04-10 PROBLEM — Z87.898 H/O MULTIPLE PULMONARY NODULES: Status: RESOLVED | Noted: 2019-03-21 | Resolved: 2019-04-10

## 2019-04-10 PROBLEM — R91.8 PULMONARY NODULES: Status: ACTIVE | Noted: 2018-04-05

## 2019-09-19 DIAGNOSIS — Z12.39 BREAST CANCER SCREENING: ICD-10-CM

## 2021-08-03 PROBLEM — E66.01 MORBID OBESITY (HCC): Status: RESOLVED | Noted: 2018-04-05 | Resolved: 2021-08-03

## 2022-07-18 NOTE — TELEPHONE ENCOUNTER
DR. HANSON WILL DO H&P    PATIENT TO STOP ASPIRIN 7 DAYS PRIOR TO PROCEDURE    Writer called pt back and relayed test results, pt also request a copy of her TB test done in 2016. Copy of her xray results and TB test mail to address on file after confirmation.